# Patient Record
Sex: MALE | Race: BLACK OR AFRICAN AMERICAN | Employment: FULL TIME | URBAN - METROPOLITAN AREA
[De-identification: names, ages, dates, MRNs, and addresses within clinical notes are randomized per-mention and may not be internally consistent; named-entity substitution may affect disease eponyms.]

---

## 2017-08-15 ENCOUNTER — OFFICE VISIT (OUTPATIENT)
Dept: INTERNAL MEDICINE CLINIC | Age: 22
End: 2017-08-15

## 2017-08-15 VITALS
HEIGHT: 71 IN | DIASTOLIC BLOOD PRESSURE: 80 MMHG | BODY MASS INDEX: 35.42 KG/M2 | HEART RATE: 80 BPM | WEIGHT: 253 LBS | SYSTOLIC BLOOD PRESSURE: 122 MMHG

## 2017-08-15 DIAGNOSIS — Z00.00 PREVENTATIVE HEALTH CARE: Primary | ICD-10-CM

## 2017-08-15 LAB
A/G RATIO: 1.6 (ref 1.1–2.2)
ALBUMIN SERPL-MCNC: 4.2 G/DL (ref 3.4–5)
ALP BLD-CCNC: 49 U/L (ref 40–129)
ALT SERPL-CCNC: 22 U/L (ref 10–40)
ANION GAP SERPL CALCULATED.3IONS-SCNC: 13 MMOL/L (ref 3–16)
AST SERPL-CCNC: 17 U/L (ref 15–37)
BILIRUB SERPL-MCNC: 2 MG/DL (ref 0–1)
BUN BLDV-MCNC: 10 MG/DL (ref 7–20)
CALCIUM SERPL-MCNC: 9 MG/DL (ref 8.3–10.6)
CHLORIDE BLD-SCNC: 102 MMOL/L (ref 99–110)
CHOLESTEROL, TOTAL: 217 MG/DL (ref 0–199)
CO2: 25 MMOL/L (ref 21–32)
CREAT SERPL-MCNC: 1.2 MG/DL (ref 0.9–1.3)
GFR AFRICAN AMERICAN: >60
GFR NON-AFRICAN AMERICAN: >60
GLOBULIN: 2.6 G/DL
GLUCOSE BLD-MCNC: 89 MG/DL (ref 70–99)
HCT VFR BLD CALC: 46.8 % (ref 40.5–52.5)
HDLC SERPL-MCNC: 38 MG/DL (ref 40–60)
HEMOGLOBIN: 15.7 G/DL (ref 13.5–17.5)
LDL CHOLESTEROL CALCULATED: 148 MG/DL
MCH RBC QN AUTO: 29.6 PG (ref 26–34)
MCHC RBC AUTO-ENTMCNC: 33.6 G/DL (ref 31–36)
MCV RBC AUTO: 88.1 FL (ref 80–100)
PDW BLD-RTO: 13.1 % (ref 12.4–15.4)
PLATELET # BLD: 195 K/UL (ref 135–450)
PMV BLD AUTO: 9.9 FL (ref 5–10.5)
POTASSIUM SERPL-SCNC: 4.2 MMOL/L (ref 3.5–5.1)
RBC # BLD: 5.31 M/UL (ref 4.2–5.9)
SODIUM BLD-SCNC: 140 MMOL/L (ref 136–145)
TOTAL PROTEIN: 6.8 G/DL (ref 6.4–8.2)
TRIGL SERPL-MCNC: 154 MG/DL (ref 0–150)
VLDLC SERPL CALC-MCNC: 31 MG/DL
WBC # BLD: 6.2 K/UL (ref 4–11)

## 2017-08-15 PROCEDURE — 99395 PREV VISIT EST AGE 18-39: CPT | Performed by: INTERNAL MEDICINE

## 2017-08-15 ASSESSMENT — ENCOUNTER SYMPTOMS
RHINORRHEA: 0
SORE THROAT: 0
DIARRHEA: 0
BACK PAIN: 0
COUGH: 0
WHEEZING: 0
CONSTIPATION: 0
ABDOMINAL PAIN: 0
BLOOD IN STOOL: 0
SHORTNESS OF BREATH: 0
EYE REDNESS: 0

## 2017-08-15 ASSESSMENT — PATIENT HEALTH QUESTIONNAIRE - PHQ9
1. LITTLE INTEREST OR PLEASURE IN DOING THINGS: 0
SUM OF ALL RESPONSES TO PHQ9 QUESTIONS 1 & 2: 0
SUM OF ALL RESPONSES TO PHQ QUESTIONS 1-9: 0
2. FEELING DOWN, DEPRESSED OR HOPELESS: 0

## 2017-08-17 DIAGNOSIS — Z00.00 PREVENTATIVE HEALTH CARE: Primary | ICD-10-CM

## 2017-08-17 LAB
BILIRUB SERPL-MCNC: 1.7 MG/DL (ref 0–1)
BILIRUBIN DIRECT: 0.3 MG/DL (ref 0–0.3)
BILIRUBIN, INDIRECT: 1.4 MG/DL (ref 0–1)

## 2018-02-20 ENCOUNTER — OFFICE VISIT (OUTPATIENT)
Dept: INTERNAL MEDICINE CLINIC | Age: 23
End: 2018-02-20

## 2018-02-20 VITALS
WEIGHT: 258 LBS | HEART RATE: 72 BPM | BODY MASS INDEX: 36.12 KG/M2 | SYSTOLIC BLOOD PRESSURE: 118 MMHG | DIASTOLIC BLOOD PRESSURE: 82 MMHG | HEIGHT: 71 IN

## 2018-02-20 DIAGNOSIS — R06.83 SNORING: ICD-10-CM

## 2018-02-20 DIAGNOSIS — Z11.3 SCREENING FOR STD (SEXUALLY TRANSMITTED DISEASE): Primary | ICD-10-CM

## 2018-02-20 DIAGNOSIS — R40.0 DAYTIME SOMNOLENCE: ICD-10-CM

## 2018-02-20 DIAGNOSIS — R68.89 EXERCISE INTOLERANCE: ICD-10-CM

## 2018-02-20 DIAGNOSIS — M79.10 MYALGIA: ICD-10-CM

## 2018-02-20 PROCEDURE — 99213 OFFICE O/P EST LOW 20 MIN: CPT | Performed by: NURSE PRACTITIONER

## 2018-02-21 LAB
C. TRACHOMATIS DNA ,URINE: NEGATIVE
HIV AG/AB: NORMAL
HIV ANTIGEN: NORMAL
HIV-1 ANTIBODY: NORMAL
HIV-2 AB: NORMAL
N. GONORRHOEAE DNA, URINE: NEGATIVE
RPR: NORMAL

## 2018-02-21 ASSESSMENT — ENCOUNTER SYMPTOMS: GASTROINTESTINAL NEGATIVE: 1

## 2018-02-21 NOTE — PROGRESS NOTES
Subjective:      Patient ID: Gabino Caicedo is a 23 y.o. male. CC: STD testing    HPI: The patient presents to the office today primarily for request for STD testing. He also has some other concerns to discuss. STD testing: The patient has recently entered a new relationship and both he and his girlfriend have decided to have STD testing prior to becoming sexually active. He has some concerns about past relationships though denies any current symptomatic concerns for STD. He has been trying to exercise more and has noticed that he gets aches and pains following exercise. He is not stretching before or after exercise. He reports fatigue with daytime somnolence and acknowledges snoring. Social History   Substance Use Topics    Smoking status: Never Smoker    Smokeless tobacco: Never Used    Alcohol use No      Comment: none       No current outpatient prescriptions on file. No current facility-administered medications for this visit. Review of Systems   Constitutional: Negative for chills and fever. Gastrointestinal: Negative. Genitourinary: Negative. Negative for discharge, penile pain and penile swelling. Skin: Negative. All other systems reviewed and are negative. Objective:   Physical Exam   Constitutional: He is oriented to person, place, and time. He appears well-developed and well-nourished. No distress. HENT:   Head: Normocephalic and atraumatic. Eyes: Conjunctivae are normal. No scleral icterus. Pulmonary/Chest: Effort normal and breath sounds normal. No respiratory distress. Musculoskeletal: Normal range of motion. He exhibits no edema. Neurological: He is alert and oriented to person, place, and time. Skin: Skin is warm and dry. He is not diaphoretic. Psychiatric: He has a normal mood and affect.  His behavior is normal. Thought content normal.     /82   Pulse 72   Ht 5' 11\" (1.803 m)   Wt 258 lb (117 kg)   BMI 35.98 kg/m² Assessment/Plan:  Tod was seen today for other and cough. Diagnoses and all orders for this visit:    Screening for STD (sexually transmitted disease)  -     C.trachomatis N.gonorrhoeae DNA, Urine  -     HIV Screen  -     RPR Reflex to Titer and TPPA    Daytime somnolence  Snoring  - Encouraged sleep med referral  - He declines for now    Myalgia  Exercise intolerance  - Discussed exercise intolerance,  - Rec:  Importance of hydration, stretching      Kerry Tafoya, CNP

## 2019-01-07 ENCOUNTER — TELEPHONE (OUTPATIENT)
Dept: INTERNAL MEDICINE CLINIC | Age: 24
End: 2019-01-07

## 2019-01-07 DIAGNOSIS — R76.11 PPD POSITIVE: Primary | ICD-10-CM

## 2020-11-11 ENCOUNTER — OFFICE VISIT (OUTPATIENT)
Dept: INTERNAL MEDICINE CLINIC | Age: 25
End: 2020-11-11
Payer: COMMERCIAL

## 2020-11-11 VITALS
HEIGHT: 71 IN | SYSTOLIC BLOOD PRESSURE: 134 MMHG | HEART RATE: 102 BPM | TEMPERATURE: 97 F | OXYGEN SATURATION: 98 % | WEIGHT: 287.6 LBS | BODY MASS INDEX: 40.26 KG/M2 | DIASTOLIC BLOOD PRESSURE: 82 MMHG

## 2020-11-11 PROBLEM — E66.01 CLASS 3 SEVERE OBESITY DUE TO EXCESS CALORIES WITHOUT SERIOUS COMORBIDITY WITH BODY MASS INDEX (BMI) OF 40.0 TO 44.9 IN ADULT (HCC): Status: ACTIVE | Noted: 2020-11-11

## 2020-11-11 PROBLEM — R53.83 FATIGUE: Status: ACTIVE | Noted: 2020-11-11

## 2020-11-11 PROBLEM — E66.813 CLASS 3 SEVERE OBESITY DUE TO EXCESS CALORIES WITHOUT SERIOUS COMORBIDITY WITH BODY MASS INDEX (BMI) OF 40.0 TO 44.9 IN ADULT: Status: ACTIVE | Noted: 2020-11-11

## 2020-11-11 PROBLEM — R06.83 SNORES: Status: ACTIVE | Noted: 2020-11-11

## 2020-11-11 PROCEDURE — 99395 PREV VISIT EST AGE 18-39: CPT | Performed by: NURSE PRACTITIONER

## 2020-11-11 ASSESSMENT — PATIENT HEALTH QUESTIONNAIRE - PHQ9
SUM OF ALL RESPONSES TO PHQ QUESTIONS 1-9: 2
2. FEELING DOWN, DEPRESSED OR HOPELESS: 0
1. LITTLE INTEREST OR PLEASURE IN DOING THINGS: 2
SUM OF ALL RESPONSES TO PHQ9 QUESTIONS 1 & 2: 2
SUM OF ALL RESPONSES TO PHQ QUESTIONS 1-9: 2
SUM OF ALL RESPONSES TO PHQ QUESTIONS 1-9: 2

## 2020-11-11 ASSESSMENT — ENCOUNTER SYMPTOMS
SHORTNESS OF BREATH: 0
COUGH: 0
WHEEZING: 0
CHEST TIGHTNESS: 0

## 2020-11-11 NOTE — PROGRESS NOTES
11/11/20     Chief Complaint   Patient presents with    Fatigue     isn't an all the time thing, comes in waves     HPI    Patient has not been seen in this office for a few years     He is here today with complaints of fatigue  Reports that it has been ongoing for years  Becoming more frequent   Reports since he started working from home it has worsened  Actually falling asleep during the day     Sleep habits:  Going to bed around 3922-0202  Falls asleep quickly  Waking up 1x around 0600 - falls back to sleep quickly   Wakes up around 730   Caffeine - none recently   etoh - minimal   Denies nicotine, other substances  Reports that he does snore at night. Has been told he has pauses at night when breathing. Left axilla - skin tag would like removed. Allergies   Allergen Reactions    Pcn [Penicillins]      No current outpatient medications on file. No current facility-administered medications for this visit. Review of Systems   Constitutional: Positive for fatigue. Negative for chills and fever. Respiratory: Negative for cough, chest tightness, shortness of breath and wheezing. Cardiovascular: Negative for chest pain, palpitations and leg swelling. Neurological: Negative for dizziness, tremors, light-headedness and headaches. Vitals:    11/11/20 1444   BP: 134/82   Pulse: 102   Temp: 97 °F (36.1 °C)   SpO2: 98%   Weight: 287 lb 9.6 oz (130.5 kg)   Height: 5' 11\" (1.803 m)      Physical Exam  Vitals signs reviewed. Constitutional:       General: He is not in acute distress. Appearance: He is well-developed. He is obese. He is not ill-appearing or diaphoretic. HENT:      Head: Normocephalic and atraumatic. Cardiovascular:      Rate and Rhythm: Normal rate and regular rhythm. Heart sounds: Normal heart sounds. No murmur. Pulmonary:      Effort: Pulmonary effort is normal. No respiratory distress. Breath sounds: Normal breath sounds. No wheezing or rhonchi.    Skin: General: Skin is warm and dry. Neurological:      General: No focal deficit present. Mental Status: He is alert and oriented to person, place, and time. Psychiatric:         Mood and Affect: Mood and affect normal.         Behavior: Behavior normal.       Assessment/Plan:  1. Annual physical exam  Due for labs   Work on diet and exercise   - Basic Metabolic Panel; Future  - CBC; Future  - Vitamin D 25 Hydroxy; Future  - TSH without Reflex; Future  - Hemoglobin A1C; Future  - Vitamin B12 & Folate; Future  - Methylmalonic Acid, Serum; Future  - Lipid Panel; Future    2. Fatigue, unspecified type  Stable, uncontrolled  Ongoing for years - reports worsening   Checking labs   Referral for sleep medicine - ? VANESSA   - CBC; Future  - Vitamin D 25 Hydroxy; Future  - TSH without Reflex; Future  - Hemoglobin A1C; Future  - Vitamin B12 & Folate; Future  - Methylmalonic Acid, Serum; Future  [facility]    3. Snores  See 2  [facility]    4. Class 3 severe obesity due to excess calories without serious comorbidity with body mass index (BMI) of 40.0 to 44.9 in adult (HCC)  Stable, uncontrolled  Encouraged [facility]    Discussed medications with patient, who voiced understanding of their use and indications. All questions answered.     Follow up in 1 yr and prn     Electronically signed by ERIKA Hardin CNP on 11/11/2020 at 3:15 PM

## 2020-11-12 DIAGNOSIS — Z00.00 ANNUAL PHYSICAL EXAM: ICD-10-CM

## 2020-11-12 DIAGNOSIS — R53.83 FATIGUE, UNSPECIFIED TYPE: ICD-10-CM

## 2020-11-12 LAB
ANION GAP SERPL CALCULATED.3IONS-SCNC: 11 MMOL/L (ref 3–16)
BUN BLDV-MCNC: 10 MG/DL (ref 7–20)
CALCIUM SERPL-MCNC: 9.7 MG/DL (ref 8.3–10.6)
CHLORIDE BLD-SCNC: 101 MMOL/L (ref 99–110)
CHOLESTEROL, TOTAL: 286 MG/DL (ref 0–199)
CO2: 28 MMOL/L (ref 21–32)
CREAT SERPL-MCNC: 1 MG/DL (ref 0.9–1.3)
FOLATE: >20 NG/ML (ref 4.78–24.2)
GFR AFRICAN AMERICAN: >60
GFR NON-AFRICAN AMERICAN: >60
GLUCOSE BLD-MCNC: 104 MG/DL (ref 70–99)
HCT VFR BLD CALC: 44.4 % (ref 40.5–52.5)
HDLC SERPL-MCNC: 37 MG/DL (ref 40–60)
HEMOGLOBIN: 14.8 G/DL (ref 13.5–17.5)
LDL CHOLESTEROL CALCULATED: 219 MG/DL
MCH RBC QN AUTO: 28.6 PG (ref 26–34)
MCHC RBC AUTO-ENTMCNC: 33.3 G/DL (ref 31–36)
MCV RBC AUTO: 85.9 FL (ref 80–100)
PDW BLD-RTO: 13.8 % (ref 12.4–15.4)
PLATELET # BLD: 204 K/UL (ref 135–450)
PMV BLD AUTO: 9.8 FL (ref 5–10.5)
POTASSIUM SERPL-SCNC: 4.4 MMOL/L (ref 3.5–5.1)
RBC # BLD: 5.17 M/UL (ref 4.2–5.9)
SODIUM BLD-SCNC: 140 MMOL/L (ref 136–145)
TRIGL SERPL-MCNC: 148 MG/DL (ref 0–150)
TSH SERPL DL<=0.05 MIU/L-ACNC: 1.22 UIU/ML (ref 0.27–4.2)
VITAMIN B-12: 881 PG/ML (ref 211–911)
VITAMIN D 25-HYDROXY: 28.4 NG/ML
VLDLC SERPL CALC-MCNC: 30 MG/DL
WBC # BLD: 6.2 K/UL (ref 4–11)

## 2020-11-13 LAB
ESTIMATED AVERAGE GLUCOSE: 99.7 MG/DL
HBA1C MFR BLD: 5.1 %

## 2020-11-14 LAB — METHYLMALONIC ACID: 0.13 UMOL/L (ref 0–0.4)

## 2020-12-23 ENCOUNTER — TELEPHONE (OUTPATIENT)
Dept: INTERNAL MEDICINE CLINIC | Age: 25
End: 2020-12-23

## 2020-12-23 ENCOUNTER — TELEPHONE (OUTPATIENT)
Dept: BARIATRICS/WEIGHT MGMT | Age: 25
End: 2020-12-23

## 2020-12-28 NOTE — TELEPHONE ENCOUNTER
Looks like both of these offices have reached out to pt on numerous occasions and have left a vm. Pt has been given information for both practices.

## 2021-01-11 ENCOUNTER — TELEPHONE (OUTPATIENT)
Dept: BARIATRICS/WEIGHT MGMT | Age: 26
End: 2021-01-11

## 2021-01-26 ENCOUNTER — VIRTUAL VISIT (OUTPATIENT)
Dept: PULMONOLOGY | Age: 26
End: 2021-01-26
Payer: COMMERCIAL

## 2021-01-26 DIAGNOSIS — R06.83 SNORING: ICD-10-CM

## 2021-01-26 DIAGNOSIS — E66.01 CLASS 3 SEVERE OBESITY DUE TO EXCESS CALORIES WITHOUT SERIOUS COMORBIDITY WITH BODY MASS INDEX (BMI) OF 40.0 TO 44.9 IN ADULT (HCC): Chronic | ICD-10-CM

## 2021-01-26 DIAGNOSIS — G47.10 HYPERSOMNIA: Primary | ICD-10-CM

## 2021-01-26 PROBLEM — E66.813 CLASS 3 SEVERE OBESITY DUE TO EXCESS CALORIES WITHOUT SERIOUS COMORBIDITY WITH BODY MASS INDEX (BMI) OF 40.0 TO 44.9 IN ADULT: Chronic | Status: ACTIVE | Noted: 2020-11-11

## 2021-01-26 PROCEDURE — 99244 OFF/OP CNSLTJ NEW/EST MOD 40: CPT | Performed by: INTERNAL MEDICINE

## 2021-01-26 ASSESSMENT — SLEEP AND FATIGUE QUESTIONNAIRES
HOW LIKELY ARE YOU TO NOD OFF OR FALL ASLEEP WHILE SITTING AND READING: 2
HOW LIKELY ARE YOU TO NOD OFF OR FALL ASLEEP WHILE SITTING QUIETLY AFTER LUNCH WITHOUT ALCOHOL: 2
HOW LIKELY ARE YOU TO NOD OFF OR FALL ASLEEP WHEN YOU ARE A PASSENGER IN A CAR FOR AN HOUR WITHOUT A BREAK: 2

## 2021-01-26 NOTE — LETTER
[facility] Sleep Medicine  [id number] [facility]  [place]  [address]  Phone: [phone]  Fax: [phone]      January 26, 2021       Patient: Erasmo Mon   MR Number: <[id number]>   YOB: 1995   Date of Visit: 1/26/2021     Thank you for allowing me to participate in the care of Erasmo Mon. Here is my assessment and plan. Also attached is a copy of his consult note:    ASSESSMENT:  Visit Diagnoses and Associated Orders     Hypersomnia   (New Problem)  -  Primary    needs work-up    Home Sleep Study (HST) [23709 Custom]   - Future Order         Snoring   (New Problem)      needs work-up    Home Sleep Study (HST) [06211 Custom]   - Future Order         Class 3 severe obesity due to excess calories without serious comorbidity with body mass index (BMI) of 40.0 to 44.9 in adult ([facility]   (Stable)                 Plan:  Differential diagnosis includes but not limited to: VANESSA, PLMD's, narcolepsy, parasomnias. Reviewed VANESSA (highest likelihood Dx): pathophysiology, diagnosis, complications and treatment. Instructed him not to drive if drowsy. Continue medications per her PCP and other physicians. Limit caffeine use after 3pm. Standard of care is to do in-lab PSG but insurance is mandating an inferior HST. 1 wk follow up after study to review his results. The chronic medical conditions listed are directly related to the primary diagnosis listed above. The management of the primary diagnosis affects the secondary diagnosis and vice versa. Pt would medically benefit from wt loss for VANESSA (diet, exercise, surgical). Orders Placed This Encounter   Procedures    Home Sleep Study (HST)         If you have questions or concerns, please do not hesitate to call me. I look forward to following Tod along with you.     Sincerely,      Marily Bacon MD    CC providers:  Joie Barrera, APRN - CNP  [address] [address]  [place] [place]

## 2021-01-26 NOTE — PROGRESS NOTES
 Home Sleep Study (HST)          Subjective:     Patient ID: Jaye Lau is a 30 y.o. male. Chief Complaint   Patient presents with    Snoring    Daytime Sleepiness       HPI:      Jaye Lau is a 30 y.o. male referred by ERIKA Irwin* for a sleep evaluation. He complains of: snoring, witnessed apneas, excessive daytime sleepiness , non-restorative sleep, AM headaches, drowsiness while driving and tossing and turning at night. He denies: cataplexy and hypnagogic hallucinations. obesity: stable on meds and followed by pt's PCP and other physicians. Previous evaluation and treatment has included- none    DOT/CDL - No  FAA/'s license -No    Previous Report(s) Reviewed: historical medical records, office notes, andreferral letter(s). Pertinent data has been documented.     Sinclair - Total score: 15    Caffeine Intake - Coffee: 1 cup(s) per day    Social History     Socioeconomic History    Marital status: Single     Spouse name: Not on file    Number of children: Not on file    Years of education: Not on file    Highest education level: Not on file   Occupational History    Not on file   Social Needs    Financial resource strain: Not on file    Food insecurity     Worry: Not on file     Inability: Not on file    Transportation needs     Medical: Not on file     Non-medical: Not on file   Tobacco Use    Smoking status: Never Smoker    Smokeless tobacco: Never Used   Substance and Sexual Activity    Alcohol use: No     Alcohol/week: 0.0 standard drinks     Comment: none    Drug use: No    Sexual activity: Not Currently     Comment: single    Lifestyle    Physical activity     Days per week: Not on file     Minutes per session: Not on file    Stress: Not on file   Relationships    Social connections     Talks on phone: Not on file     Gets together: Not on file     Attends Holiness service: Not on file     Active member of club or organization: Not on file Attends meetings of clubs or organizations: Not on file     Relationship status: Not on file    Intimate partner violence     Fear of current or ex partner: Not on file     Emotionally abused: Not on file     Physically abused: Not on file     Forced sexual activity: Not on file   Other Topics Concern    Not on file   Social History Narrative    Not on file        No current outpatient medications on file. No current facility-administered medications for this visit. Allergies as of 01/26/2021 - Review Complete 01/26/2021   Allergen Reaction Noted    Pcn [penicillins]  06/29/2015       Patient Active Problem List   Diagnosis    Class 3 severe obesity due to excess calories without serious comorbidity with body mass index (BMI) of 40.0 to 44.9 in adult (HCC)    Snores    Fatigue       History reviewed. No pertinent past medical history. History reviewed. No pertinent surgical history. Family History   Problem Relation Age of Onset    Cancer Mother         breast    Diabetes Mother     Diabetes Maternal Grandmother        Objective:     Vitals:  Patient reported Height and Weight Calculated BMI   Patient-Reported Vitals 1/26/2021   Patient-Reported Weight 285#   Patient-Reported Height 5'11\"       39.7     Due to COVID-19 this was a virtual visit and physical exam was deferred.     Electronically signed by Franky Richter MD on1/26/2021 at 3:04 PM

## 2021-01-27 ENCOUNTER — TELEPHONE (OUTPATIENT)
Dept: SLEEP CENTER | Age: 26
End: 2021-01-27

## 2021-04-15 ENCOUNTER — OFFICE VISIT (OUTPATIENT)
Dept: INTERNAL MEDICINE CLINIC | Age: 26
End: 2021-04-15
Payer: COMMERCIAL

## 2021-04-15 VITALS
BODY MASS INDEX: 39.58 KG/M2 | OXYGEN SATURATION: 95 % | DIASTOLIC BLOOD PRESSURE: 78 MMHG | WEIGHT: 283.8 LBS | SYSTOLIC BLOOD PRESSURE: 128 MMHG | HEART RATE: 86 BPM

## 2021-04-15 DIAGNOSIS — E66.01 CLASS 3 SEVERE OBESITY DUE TO EXCESS CALORIES WITHOUT SERIOUS COMORBIDITY WITH BODY MASS INDEX (BMI) OF 40.0 TO 44.9 IN ADULT (HCC): ICD-10-CM

## 2021-04-15 DIAGNOSIS — R53.83 FATIGUE, UNSPECIFIED TYPE: ICD-10-CM

## 2021-04-15 DIAGNOSIS — R06.83 SNORES: Primary | ICD-10-CM

## 2021-04-15 PROCEDURE — 99214 OFFICE O/P EST MOD 30 MIN: CPT | Performed by: NURSE PRACTITIONER

## 2021-04-15 ASSESSMENT — PATIENT HEALTH QUESTIONNAIRE - PHQ9
2. FEELING DOWN, DEPRESSED OR HOPELESS: 0
SUM OF ALL RESPONSES TO PHQ QUESTIONS 1-9: 0
1. LITTLE INTEREST OR PLEASURE IN DOING THINGS: 0
SUM OF ALL RESPONSES TO PHQ9 QUESTIONS 1 & 2: 0

## 2021-04-15 NOTE — LETTER
Patient Name: Agnes Arzate        [facility]  Certification of Health Care Provider for  [facility] Serious Health Condition  (Family and Medical Leave Act)    Attached please find the applicable completed  [address] [facility]) certification form. [address] certification forms (W-380-E, W-380-F, [id number], & WH-385-V). Accordingly, the attached form complies in all material respects with applicable [facility] regulations and is being provided in lieu of any certification forms submitted to [facility] by the Employer. Provider's name: Jessica Leon, APRN - CNP      [address] [facility] INTERNAL MEDICINE  [address] [facility] [address]  Dept: [phone]  Dept Fax: [phone]  Loc: [phone]    PART A: MEDICAL FACTS  1. Approximate date condition commenced: 4/15/2021  Probable duration of condition:3 months     Azam below as applicable:  Was the patient admitted for an overnight stay in a hospital, hospice, or residential medical     care facility? No  If so, dates of admission: N/A. Date(s) you treated the patient for condition: 2020 and 4/15/2021. Will the patient need to have treatment visits at least twice per year due to the condition? Yes. Was medication, other than over-the-counter medication, prescribed? No     Was the patient referred to other health care provider(s) for evaluation or treatment (e.g.,            physical therapist)? Yes. If so, state the nature of such treatments and expected          duration of treatment: Sleep Medicine. 2. Is the medical condition pregnancy? No. If so, expected delivery date: N/A.    3. Use any information provided by the employer to answer this question.  If the employer fails to provide a list of the employee's essential functions or a job description, answer these             questions based upon the employee's own description of his/her job functions. Is the employee unable to perform any of his/her job functions due to the condition:             No.    If so, Identify the job functions the employee is unable to perform: N/A.    4. Describe other relevant medical facts, if any, related to the condition for which the employee    seeks leave (such medical facts may include symptoms, diagnosis, or any regimen of continuing treatment such as the use of specialized equipment): daytime fatigue that is uncontrolled, working with sleep medicine to get a home sleep study to determine appropriate treatment. Kusum Faith PART B: AMOUNT OF LEAVE NEEDED  5. Will the employee be incapacitated for a single continuous period of time due to his/her medical condition, including any time for treatment and recovery? No.     If so, estimate the beginning and ending dates for the period of incapacity: N/A.    6. Will the employee need to attend follow-up treatment appointments or work part-time or on a reduced schedule because of the employee's medical condition? Employee needs increased break times during the day to help with daytime fatigue secondary to uncontrolled sleep condition. Has appt scheduled for work up and follow up with sleep medicine. If so, are the treatments or the reduced number of hours of work medically necessary? Yes. Estimated treatment schedule, if any, including the dates of any scheduled   appointments and the time required for each appointment, including any recovery period: will need close follow up initially for diagnosis and treatment options, lasting 2-3 hrs at a time. .     Estimate the part-time or reduced work schedule the employee needs, if any: increase break time by 2-3 hrs per week      7. Will the condition cause episodic flare-ups periodically preventing the employee from  performing his/her job functions? Yes until stable and controlled .     Is it medically necessary for the employee to be absent from work during

## 2021-04-15 NOTE — PROGRESS NOTES
4/15/21     Chief Complaint   Patient presents with    Forms     FMLA, sleep apnea, says he has to take more breaks during work than allowed     HPI     Pt is here for fatigue, hypersomnia, snoring and obesity   Seeing Dr. Luci Mars and working up for likely VANESSA   Has HST scheduled on 4/23/2021, mandated by his insurance over an in lab PSG  Due to daytime fatigue - is getting up and moving around more to take increased breaks to prevent from falling asleep during the day. 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 Pt is asking for increased break times until work up is complete and stabilized     Allergies   Allergen Reactions    Pcn [Penicillins]      No current outpatient medications on file. No current facility-administered medications for this visit. Review of Systems  Negative other than HPI     Vitals:    04/15/21 0830   BP: 128/78   Pulse: 86   SpO2: 95%   Weight: 283 lb 12.8 oz (128.7 kg)      Physical Exam  Constitutional:       General: He is not in acute distress. Appearance: Normal appearance. He is obese. He is not ill-appearing. HENT:      Head: Normocephalic and atraumatic. Pulmonary:      Effort: Pulmonary effort is normal. No respiratory distress. Neurological:      General: No focal deficit present. Mental Status: He is alert and oriented to person, place, and time. Mental status is at baseline.    Psychiatric:         Mood and Affect: Mood normal.         Behavior: Behavior normal.       Assessment/Plan:  Snores/ Fatigue, unspecified type  Stable, uncontrolled  Has HST scheduled for 4/23  Working with sleep medicine for better control  FMLA complete for increased break time due to fatigue until controlled    Class 3 severe obesity due to excess calories without serious comorbidity with body mass index (BMI) of 40.0 to 44.9 in adult [facility])  Uncontrolled  Pt could benefit from weight loss  Work on diet and exercise     Discussed medications with patient, who voiced understanding of their use and indications. All questions answered.     Reviewed follow up criteria     Electronically signed by ERIKA Phipps CNP on 4/15/2021 at 8:58 AM

## 2021-04-26 ENCOUNTER — HOSPITAL ENCOUNTER (OUTPATIENT)
Dept: SLEEP CENTER | Age: 26
Discharge: HOME | End: 2021-04-26
Payer: COMMERCIAL

## 2021-04-26 DIAGNOSIS — R06.83 SNORING: ICD-10-CM

## 2021-04-26 DIAGNOSIS — G47.10 HYPERSOMNIA: ICD-10-CM

## 2021-04-26 PROCEDURE — 95806 SLEEP STUDY UNATT&RESP EFFT: CPT

## 2021-04-26 PROCEDURE — 95806 SLEEP STUDY UNATT&RESP EFFT: CPT | Performed by: INTERNAL MEDICINE

## 2021-04-29 ENCOUNTER — TELEPHONE (OUTPATIENT)
Dept: PULMONOLOGY | Age: 26
End: 2021-04-29

## 2021-04-29 NOTE — PROGRESS NOTES
Usha Lea         : 1995    Diagnosis: [x] VANESSA (G47.33) [] CSA (G47.31) [] Apnea (G47.30)   Length of Need: [x] 12 Months [] 99 Months [] Other:    Machine (ARI!): [x] Respironics Dream Station   2   Auto [] ResMed AirSense     Auto [] Other:     [x]  CPAP () [] Bilevel ()   Mode: [x] Auto [] Spontaneous    Mode: [] Auto [] Spontaneous          P min 7 cmH2O  P ma 17 cmH2O                 Comfort Settings:   - Ramp Pressure: 4 cmH2O                                        - Ramp time: 15 min                                     -  Flex/EPR - 3 full time                                    - For ResMed Bilevel (TiMax-4 sec   TiMin- 0.2 sec)     Humidifier: [x] Heated ()        [x] Water chamber replacement ()/ 1 per 6 months        Mask:   [x] Nasal () /1 per 3 months [] Full Face () /1 per 3 months   [x] Patient choice -Size and fit mask [] Patient Choice - Size and fit mask   [] Dispense:  [] Dispense:    [x] Headgear () / 1 per 3 months [] Headgear () / 1 per 3 months   [x] Replacement Nasal Cushion ()/2 per month [] Interface Replacement ()/1 per month   [] Replacement Nasal Pillows ()/2 per month         Tubing: [x] Heated ()/1 per 3 months    [] Standard ()/1 per 3 months [] Other:           Filters: [x] Non-disposable ()/1 per 6 months     [x] Ultra-Fine, Disposable ()/2 per month        Miscellaneous: [] Chin Strap ()/ 1 per 6 months [] O2 bleed-in:       LPM   [] Oximetry on CPAP/Bilevel []  Other:    [x] Modem: ()         Start Order Date: 21    MEDICAL JUSTIFICATION:  I, the undersigned, certify that the above prescribed supplies are medically necessary for this patients wellbeing. In my opinion, the supplies are both reasonable and necessary in reference to accepted standards of medicalpractice in treatment of this patients condition.     Sera Mcintyre MD      NPI: [id number]       Order Signed Date: 21    Electronically signed by Catalina Davila MD on 2021 at 9:53 AM    Rachel Lyon  1995  [address] [place] [address] [address]  [phone] (home)   [phone] (mobile)      Insurance Info (confirm with patient if correct):  Payor/Plan Subscr  Sex Relation Sub.  Ins. ID Effective Group Num

## 2021-04-29 NOTE — PROGRESS NOTES
Rachel Hidalgo         : 1995 [facility]    Diagnosis: [x] VANESSA (G47.33) [] CSA (G47.31) [] Apnea (G47.30)   Length of Need: [x] 12 Months [] 99 Months [] Other:    Machine (ARI!): [x] Respironics Dream Station   2   Auto [] ResMed AirSense     Auto [] Other:     [x]  CPAP () [] Bilevel ()   Mode: [x] Auto [] Spontaneous    Mode: [] Auto [] Spontaneous         P min 7 cmH2O  P max 17 mH2O                   Comfort Settings:   - Ramp Pressure: 4 cmH2O                                        - Ramp time: 15 min                                     -  Flex/EPR - 3 full time                                    - For ResMed Bilevel (TiMax-4 sec   TiMin- 0.2 sec)     Humidifier: [x] Heated ()        [x] Water chamber replacement ()/ 1 per 6 months        Mask:   [x] Nasal () /1 per 3 months [] Full Face () /1 per 3 months   [x] Patient choice -Size and fit mask [] Patient Choice - Size and fit mask   [] Dispense:  [] Dispense:    [x] Headgear () / 1 per 3 months [] Headgear () / 1 per 3 months   [x] Replacement Nasal Cushion ()/2 per month [] Interface Replacement ()/1 per month   [] Replacement Nasal Pillows ()/2 per month         Tubing: [x] Heated ()/1 per 3 months    [] Standard ()/1 per 3 months [] Other:           Filters: [x] Non-disposable ()/1 per 6 months     [x] Ultra-Fine, Disposable ()/2 per month        Miscellaneous: [] Chin Strap ()/ 1 per 6 months [] O2 bleed-in:       LPM   [] Oximetry on CPAP/Bilevel []  Other:    [x] Modem: ()         Start Order Date: 21    MEDICAL JUSTIFICATION:  I, the undersigned, certify that the above prescribed supplies are medically necessary for this patients wellbeing. In my opinion, the supplies are both reasonable and necessary in reference to accepted standards of medicalpractice in treatment of this patients condition.     Arjun Elder MD      NPI: [id number]       Order Signed Date: 21    Electronically signed by Alina Morales MD on 2021 at 10:11 AM    Tod Og  1995  [address] [place] [address] [address]  [phone] (home)   [phone] (mobile)      Insurance Info (confirm with patient if correct):  Payor/Plan Subscr  Sex Relation Sub.  Ins. ID Effective Group Num

## 2021-06-03 ENCOUNTER — OFFICE VISIT (OUTPATIENT)
Dept: INTERNAL MEDICINE CLINIC | Age: 26
End: 2021-06-03
Payer: COMMERCIAL

## 2021-06-03 VITALS
BODY MASS INDEX: 40.18 KG/M2 | DIASTOLIC BLOOD PRESSURE: 86 MMHG | HEART RATE: 80 BPM | HEIGHT: 71 IN | SYSTOLIC BLOOD PRESSURE: 128 MMHG | WEIGHT: 287 LBS

## 2021-06-03 DIAGNOSIS — F41.9 ANXIETY AND DEPRESSION: ICD-10-CM

## 2021-06-03 DIAGNOSIS — R53.83 FATIGUE, UNSPECIFIED TYPE: ICD-10-CM

## 2021-06-03 DIAGNOSIS — E55.9 VITAMIN D DEFICIENCY: ICD-10-CM

## 2021-06-03 DIAGNOSIS — F32.A ANXIETY AND DEPRESSION: Primary | ICD-10-CM

## 2021-06-03 DIAGNOSIS — Z99.89 OSA ON CPAP: ICD-10-CM

## 2021-06-03 DIAGNOSIS — F32.A ANXIETY AND DEPRESSION: ICD-10-CM

## 2021-06-03 DIAGNOSIS — G47.33 OSA ON CPAP: ICD-10-CM

## 2021-06-03 DIAGNOSIS — F41.9 ANXIETY AND DEPRESSION: Primary | ICD-10-CM

## 2021-06-03 DIAGNOSIS — E66.01 CLASS 3 SEVERE OBESITY DUE TO EXCESS CALORIES WITHOUT SERIOUS COMORBIDITY WITH BODY MASS INDEX (BMI) OF 40.0 TO 44.9 IN ADULT (HCC): Chronic | ICD-10-CM

## 2021-06-03 LAB
T4 FREE: 1.1 NG/DL (ref 0.9–1.8)
TSH SERPL DL<=0.05 MIU/L-ACNC: 0.82 UIU/ML (ref 0.27–4.2)
VITAMIN D 25-HYDROXY: 37.8 NG/ML

## 2021-06-03 PROCEDURE — 99214 OFFICE O/P EST MOD 30 MIN: CPT | Performed by: NURSE PRACTITIONER

## 2021-06-03 ASSESSMENT — COLUMBIA-SUICIDE SEVERITY RATING SCALE - C-SSRS
1. WITHIN THE PAST MONTH, HAVE YOU WISHED YOU WERE DEAD OR WISHED YOU COULD GO TO SLEEP AND NOT WAKE UP?: YES
6. HAVE YOU EVER DONE ANYTHING, STARTED TO DO ANYTHING, OR PREPARED TO DO ANYTHING TO END YOUR LIFE?: NO
2. HAVE YOU ACTUALLY HAD ANY THOUGHTS OF KILLING YOURSELF?: NO

## 2021-06-03 ASSESSMENT — PATIENT HEALTH QUESTIONNAIRE - PHQ9
4. FEELING TIRED OR HAVING LITTLE ENERGY: 2
SUM OF ALL RESPONSES TO PHQ QUESTIONS 1-9: 17
1. LITTLE INTEREST OR PLEASURE IN DOING THINGS: 3
9. THOUGHTS THAT YOU WOULD BE BETTER OFF DEAD, OR OF HURTING YOURSELF: 1
10. IF YOU CHECKED OFF ANY PROBLEMS, HOW DIFFICULT HAVE THESE PROBLEMS MADE IT FOR YOU TO DO YOUR WORK, TAKE CARE OF THINGS AT HOME, OR GET ALONG WITH OTHER PEOPLE: 2
SUM OF ALL RESPONSES TO PHQ QUESTIONS 1-9: 16
SUM OF ALL RESPONSES TO PHQ9 QUESTIONS 1 & 2: 5
2. FEELING DOWN, DEPRESSED OR HOPELESS: 2
SUM OF ALL RESPONSES TO PHQ QUESTIONS 1-9: 17
6. FEELING BAD ABOUT YOURSELF - OR THAT YOU ARE A FAILURE OR HAVE LET YOURSELF OR YOUR FAMILY DOWN: 3
8. MOVING OR SPEAKING SO SLOWLY THAT OTHER PEOPLE COULD HAVE NOTICED. OR THE OPPOSITE, BEING SO FIGETY OR RESTLESS THAT YOU HAVE BEEN MOVING AROUND A LOT MORE THAN USUAL: 0
7. TROUBLE CONCENTRATING ON THINGS, SUCH AS READING THE NEWSPAPER OR WATCHING TELEVISION: 3
5. POOR APPETITE OR OVEREATING: 2
3. TROUBLE FALLING OR STAYING ASLEEP: 1

## 2021-06-03 NOTE — PROGRESS NOTES
6/3/21     Chief Complaint   Patient presents with    Depression     with anxiety would like to see a psychiatrist.     HPI     Here for anixety and depression   Previously reports he has done better at managing his symptoms with meditation and coping skills. Realizing that he is having trouble doing this. Not wanting to do anything fun or motivation. Increased stress with work. Finding himself freezing with stress. Feeling overwhelmed a lot of the time. Symptoms are worse over the past few years - becoming more frequent and intense over the past year. Has not ever expressed these concerns to anyone before. Here with s/o who is supportive. Has thoughts of being better off dead, denies any SI/HI, plan or self harm. Denies any previous self harm. VANESSA - sleep is getting better with cpap - increased energy during the day. PHQ Scores 6/3/2021 4/15/2021 11/11/2020 8/15/2017   PHQ2 Score 5 0 2 0   PHQ9 Score 17 0 2 0     Interpretation of Total Score Depression Severity: 1-4 = Minimal depression, 5-9 = Mild depression, 10-14 = Moderate depression, 15-19 = Moderately severe depression, 20-27 = Severe depression    Allergies   Allergen Reactions    Pcn [Penicillins]      Current Outpatient Medications   Medication Sig Dispense Refill    sertraline (ZOLOFT) 50 MG tablet Take 1 tablet by mouth daily 30 tablet 1     No current facility-administered medications for this visit. Review of Systems   Negative other than HPI     Vitals:    06/03/21 0944   BP: 128/86   Pulse: 80   Weight: 287 lb (130.2 kg)   Height: 5' 11\" (1.803 m)      Physical Exam  Constitutional:       General: He is not in acute distress. Appearance: Normal appearance. He is not ill-appearing. HENT:      Head: Normocephalic and atraumatic. Pulmonary:      Effort: Pulmonary effort is normal. No respiratory distress. Neurological:      General: No focal deficit present.       Mental Status: He is alert and oriented to person, place, and time. Mental status is at baseline. Psychiatric:         Mood and Affect: Mood normal.         Behavior: Behavior normal.       Assessment/Plan:  1. Anxiety and depression  uncontrolled  TSH 1.22 in November - rechecking labs today given increased symptoms   Discussed options in detail - referral to see Cj/ Dr. Evelyn Merritt, appt scheduled for tomorrow. Starting sertraline at 50mg - aware we will likely need to increase dose.    - TSH without Reflex; Future  - T4, Free; Future  - sertraline (ZOLOFT) 50 MG tablet; Take 1 tablet by mouth daily  Dispense: 30 tablet; Refill: 1    I've explained to him that drugs of the SSRI class can have side effects such as weight gain, sexual dysfunction, insomnia, headache, nausea. These medications are generally effective at alleviating symptoms of anxiety and/or depression. Let me know if significant side effects do occur. 2. VANESSA on CPAP  Stable, doing well on cpap   Fatigue is improving     3. Fatigue, unspecified type  See above     4. Class 3 severe obesity due to excess calories without serious comorbidity with body mass index (BMI) of 40.0 to 44.9 in adult (HCC)  Stable, work on diet and exercise     5. Vitamin D deficiency  Stable, on supplement and rechecking D today   - Vitamin D 25 Hydroxy; Future    Discussed medications with patient, who voiced understanding of their use and indications. All questions answered. Return in about 6 weeks (around 7/15/2021), or if symptoms worsen or fail to improve, for mood.       Electronically signed by Alejo Skiff, APRN - CNP on 6/3/2021 at 10:33 AM

## 2021-06-04 ENCOUNTER — OFFICE VISIT (OUTPATIENT)
Dept: PSYCHOLOGY | Age: 26
End: 2021-06-04
Payer: COMMERCIAL

## 2021-06-04 DIAGNOSIS — F43.21 ADJUSTMENT DISORDER WITH DEPRESSED MOOD: Primary | ICD-10-CM

## 2021-06-04 PROCEDURE — 90791 PSYCH DIAGNOSTIC EVALUATION: CPT | Performed by: PSYCHOLOGIST

## 2021-06-04 NOTE — PROGRESS NOTES
Behavioral Health Consultation  Agatha Rodriguez M.A. Behavioral Health Consultant  Psychology Trainee  Modesta Dasilva, Ph.D.  Psychology Supervisor  6/4/2021  9:05 AM      Time spent with Patient: 35 minutes  This is patient's first [facility] appointment. Reason for Consult:  Depression    Pt provided informed consent for the behavioral health program and nature of supervision. Discussed with patient model of service to include the limits of confidentiality (i.e. abuse reporting, suicide intervention, etc.) and short-term intervention focused approach. Pt indicated understanding. Feedback given to PCP. Provider location: [place]:  Pt seen per PCP re: depression. Patient reported depressive symptoms, including depressed mood, deactivation, anhedonia, and fatigue. Pt indicated that he has though he strong coping skills, but thinks that he is not properly balancing different aspects of his life. 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 He is concerned that he will remain \"stuck\" in his current financial and educational circumstances. Pt is working towards  certification but sometimes struggles managing both his classes and work. Pt queried clinician regarding FMLA paperwork, and when told that his concerns would likely not receive approval for an FMLA, Pt expressed disappointment. Pt sleeps for approximately 6-7 hours per night and finds it restful with his CPAP. He has strong social support.      O:  MSE:    Appearance    alert, cooperative  Impulsive behavior No  Speech    normal rate and normal volume  Mood    Depressed  Affect    normal affect  Thought Content    intact  Thought Process    linear and coherent  Associations    logical connections  Insight    Good  Judgment    Intact  Orientation    oriented to person, place, time, and general circumstances Memory    recent and remote memory intact  Attention/Concentration    intact  Morbid ideation No  Suicide Assessment    no suicidal ideation    History:    Social History:   Social History     Socioeconomic History    Marital status: Single     Spouse name: Not on file    Number of children: Not on file    Years of education: Not on file    Highest education level: Not on file   Occupational History    Not on file   Tobacco Use    Smoking status: Never Smoker    Smokeless tobacco: Never Used   Substance and Sexual Activity    Alcohol use: No     Alcohol/week: 0.0 standard drinks     Comment: none    Drug use: No    Sexual activity: Not Currently     Comment: single    Other Topics Concern    Not on file   Social History Narrative    Not on file     Social Determinants of Health     Financial Resource Strain:     Difficulty of Paying Living Expenses:    Food Insecurity:     Worried About Running Out of Food in the Last Year:     Ran Out of Food in the Last Year:    Transportation Needs:     Lack of Transportation (Medical):  Lack of Transportation (Non-Medical):    Physical Activity:     Days of Exercise per Week:     Minutes of Exercise per Session:    Stress:     Feeling of Stress :    Social Connections:     Frequency of Communication with Friends and Family:     Frequency of Social Gatherings with Friends and Family:     Attends Church Services:     Active Member of Clubs or Organizations:     Attends Club or Organization Meetings:     Marital Status:    Intimate Partner Violence:     Fear of Current or Ex-Partner:     Emotionally Abused:     Physically Abused:     Sexually Abused:      TOBACCO:   reports that he has never smoked. He has never used smokeless tobacco.  ETOH:   reports no history of alcohol use.     Diagnosis:  Adjustment disorder w/ depressed mood     Plan:  Pt interventions:  Established rapport, Conducted functional assessment, [place]-setting to identify pt's primary goals for [facility] visit / overall health and Supportive techniques, Provided psychoeducation on behavioral activation and collaboratively identified ways for Pt to regularly engage in his previous activities

## 2022-12-12 ENCOUNTER — TELEPHONE (OUTPATIENT)
Dept: INTERNAL MEDICINE CLINIC | Age: 27
End: 2022-12-12

## 2022-12-12 NOTE — TELEPHONE ENCOUNTER
Right ear issue. Feels like he cannot hear well. Onset 2 weeks ago but worse x 2.5 days. Fever and runny nose at first.  Went to urgent care and given steroids. Seemed to be doing better but 2 nights ago noted right ear with hearing loss --- like having an ear bud in the ear. Not painful. Discussed most likely middle ear fluid and may take time to resolve. Recommend Sudafed PSE 12 hour prn and Flonase 2 sprays each nostril daily until ear is better. Recommend in person OV if painful.

## 2023-01-13 ENCOUNTER — TELEPHONE (OUTPATIENT)
Dept: INTERNAL MEDICINE CLINIC | Age: 28
End: 2023-01-13

## 2023-01-13 NOTE — TELEPHONE ENCOUNTER
Hold for [employer]. I notified pt. He needs seen since it has been so long - I did not want to use any blocked slots without approval. Advised pt. [employer] would be back in Tuesday to let him know what his next available is.

## 2023-01-13 NOTE — TELEPHONE ENCOUNTER
----- Message from [facility] sent at 1/13/2023  2:50 PM EST -----  Subject: Message to Provider    QUESTIONS  Information for Provider? Patient is needing a prescription C-PAP machine   . His insurance company stated he needs a prescription, his demographics,   and the pressure setting sent to them so it can be approved. He now has   [employer], please call patient for further questions or when the prescription   is sent.   ---------------------------------------------------------------------------  --------------  [facility]  [phone]; OK to leave message on voicemail  ---------------------------------------------------------------------------  --------------  SCRIPT ANSWERS  Relationship to Patient?  Self

## 2023-01-27 ENCOUNTER — OFFICE VISIT (OUTPATIENT)
Dept: INTERNAL MEDICINE CLINIC | Age: 28
End: 2023-01-27
Payer: OTHER GOVERNMENT

## 2023-01-27 VITALS
HEART RATE: 76 BPM | OXYGEN SATURATION: 97 % | HEIGHT: 71 IN | WEIGHT: 275 LBS | SYSTOLIC BLOOD PRESSURE: 128 MMHG | DIASTOLIC BLOOD PRESSURE: 82 MMHG | BODY MASS INDEX: 38.5 KG/M2

## 2023-01-27 DIAGNOSIS — E66.09 CLASS 2 OBESITY DUE TO EXCESS CALORIES WITHOUT SERIOUS COMORBIDITY WITH BODY MASS INDEX (BMI) OF 38.0 TO 38.9 IN ADULT: ICD-10-CM

## 2023-01-27 DIAGNOSIS — G47.33 OSA ON CPAP: ICD-10-CM

## 2023-01-27 DIAGNOSIS — E78.41 ELEVATED LIPOPROTEIN(A): ICD-10-CM

## 2023-01-27 DIAGNOSIS — R73.09 ELEVATED GLUCOSE: Primary | ICD-10-CM

## 2023-01-27 DIAGNOSIS — Z71.89 ACP (ADVANCE CARE PLANNING): ICD-10-CM

## 2023-01-27 DIAGNOSIS — Z00.00 ENCOUNTER FOR WELL ADULT EXAM WITHOUT ABNORMAL FINDINGS: ICD-10-CM

## 2023-01-27 DIAGNOSIS — F32.A ANXIETY AND DEPRESSION: ICD-10-CM

## 2023-01-27 DIAGNOSIS — F41.9 ANXIETY AND DEPRESSION: ICD-10-CM

## 2023-01-27 DIAGNOSIS — Z00.00 ENCOUNTER FOR WELL ADULT EXAM WITHOUT ABNORMAL FINDINGS: Primary | ICD-10-CM

## 2023-01-27 DIAGNOSIS — Z99.89 OSA ON CPAP: ICD-10-CM

## 2023-01-27 LAB
ANION GAP SERPL CALCULATED.3IONS-SCNC: 15 MMOL/L (ref 3–16)
BUN BLDV-MCNC: 8 MG/DL (ref 7–20)
CALCIUM SERPL-MCNC: 9.9 MG/DL (ref 8.3–10.6)
CHLORIDE BLD-SCNC: 100 MMOL/L (ref 99–110)
CHOLESTEROL, TOTAL: 282 MG/DL (ref 0–199)
CO2: 25 MMOL/L (ref 21–32)
CREAT SERPL-MCNC: 1.1 MG/DL (ref 0.9–1.3)
GFR SERPL CREATININE-BSD FRML MDRD: >60 ML/MIN/{1.73_M2}
GLUCOSE BLD-MCNC: 124 MG/DL (ref 70–99)
HDLC SERPL-MCNC: 35 MG/DL (ref 40–60)
LDL CHOLESTEROL CALCULATED: 210 MG/DL
POTASSIUM SERPL-SCNC: 4.6 MMOL/L (ref 3.5–5.1)
SODIUM BLD-SCNC: 140 MMOL/L (ref 136–145)
TRIGL SERPL-MCNC: 185 MG/DL (ref 0–150)
VLDLC SERPL CALC-MCNC: 37 MG/DL

## 2023-01-27 PROCEDURE — 99395 PREV VISIT EST AGE 18-39: CPT | Performed by: NURSE PRACTITIONER

## 2023-01-27 RX ORDER — ATORVASTATIN CALCIUM 20 MG/1
20 TABLET, FILM COATED ORAL DAILY
Qty: 30 TABLET | Refills: 3 | Status: SHIPPED | OUTPATIENT
Start: 2023-01-27

## 2023-01-27 SDOH — ECONOMIC STABILITY: FOOD INSECURITY: WITHIN THE PAST 12 MONTHS, THE FOOD YOU BOUGHT JUST DIDN'T LAST AND YOU DIDN'T HAVE MONEY TO GET MORE.: NEVER TRUE

## 2023-01-27 SDOH — ECONOMIC STABILITY: FOOD INSECURITY: WITHIN THE PAST 12 MONTHS, YOU WORRIED THAT YOUR FOOD WOULD RUN OUT BEFORE YOU GOT MONEY TO BUY MORE.: NEVER TRUE

## 2023-01-27 ASSESSMENT — PATIENT HEALTH QUESTIONNAIRE - PHQ9
SUM OF ALL RESPONSES TO PHQ9 QUESTIONS 1 & 2: 0
8. MOVING OR SPEAKING SO SLOWLY THAT OTHER PEOPLE COULD HAVE NOTICED. OR THE OPPOSITE, BEING SO FIGETY OR RESTLESS THAT YOU HAVE BEEN MOVING AROUND A LOT MORE THAN USUAL: 0
7. TROUBLE CONCENTRATING ON THINGS, SUCH AS READING THE NEWSPAPER OR WATCHING TELEVISION: 1
6. FEELING BAD ABOUT YOURSELF - OR THAT YOU ARE A FAILURE OR HAVE LET YOURSELF OR YOUR FAMILY DOWN: 0
2. FEELING DOWN, DEPRESSED OR HOPELESS: 0
SUM OF ALL RESPONSES TO PHQ QUESTIONS 1-9: 7
1. LITTLE INTEREST OR PLEASURE IN DOING THINGS: 0
SUM OF ALL RESPONSES TO PHQ QUESTIONS 1-9: 7
SUM OF ALL RESPONSES TO PHQ QUESTIONS 1-9: 7
5. POOR APPETITE OR OVEREATING: 0
9. THOUGHTS THAT YOU WOULD BE BETTER OFF DEAD, OR OF HURTING YOURSELF: 0
10. IF YOU CHECKED OFF ANY PROBLEMS, HOW DIFFICULT HAVE THESE PROBLEMS MADE IT FOR YOU TO DO YOUR WORK, TAKE CARE OF THINGS AT HOME, OR GET ALONG WITH OTHER PEOPLE: 1
3. TROUBLE FALLING OR STAYING ASLEEP: 3
4. FEELING TIRED OR HAVING LITTLE ENERGY: 3
SUM OF ALL RESPONSES TO PHQ QUESTIONS 1-9: 7

## 2023-01-27 ASSESSMENT — SOCIAL DETERMINANTS OF HEALTH (SDOH): HOW HARD IS IT FOR YOU TO PAY FOR THE VERY BASICS LIKE FOOD, HOUSING, MEDICAL CARE, AND HEATING?: NOT HARD AT ALL

## 2023-01-27 ASSESSMENT — ENCOUNTER SYMPTOMS
WHEEZING: 0
SHORTNESS OF BREATH: 0
COUGH: 0
CHEST TIGHTNESS: 0

## 2023-01-27 NOTE — PROGRESS NOTES
1/27/23     Chief Complaint   Patient presents with    Orders     Needs and order for a new CPAP machine. Previous one is broken. Not seen since 6/3/21    Annual Exam     Fasting for labs - biometric form brought in today      HPI    Here for annual exam and follow up  He has not been seen in a few years  Fasting today   Denies concerns today other than he needs a new cpap   He had a cpap machine that broke and insurance told him he can get a new machine, will not repair old machine. Allergies   Allergen Reactions    Pcn [Penicillins]        No current outpatient medications on file. No current facility-administered medications for this visit. Review of Systems   Constitutional:  Negative for chills and fever. Respiratory:  Negative for cough, chest tightness, shortness of breath and wheezing. Cardiovascular:  Negative for chest pain, palpitations and leg swelling. Neurological:  Negative for dizziness, tremors, light-headedness and headaches. Vitals:    01/27/23 0847   BP: 128/82   Pulse: 76   SpO2: 97%   Weight: 275 lb (124.7 kg)   Height: 5' 11\" (1.803 m)      Physical Exam  Constitutional:       General: He is not in acute distress. Appearance: Normal appearance. He is not ill-appearing. HENT:      Head: Normocephalic and atraumatic. Cardiovascular:      Rate and Rhythm: Normal rate and regular rhythm. Pulmonary:      Effort: Pulmonary effort is normal. No respiratory distress. Breath sounds: Normal breath sounds. Neurological:      Mental Status: He is alert and oriented to person, place, and time. Mental status is at baseline. Psychiatric:         Mood and Affect: Mood normal.         Behavior: Behavior normal.     Assessment/Plan:  1. Encounter for well adult exam without abnormal findings  Overall doing well. Fasting labs today. Biometric screening forms will be complete when labs resulted. - Lipid Panel; Future  - Basic Metabolic Panel; Future    2.  VANESSA on CPAP  Chronic, uncontrolled d/t broken cpap. Last sleep study was in 2021. He has not seen Dr. Evan Christiansen / sleep medicine since. Referral placed for him to schedule follow up appt. - Renato Lewis MD, Sleep Medicine, [facility]    3. Anxiety and depression  Chronic, stable, controlled without medication. 4. Class 3 severe obesity due to excess calories without serious comorbidity with body mass index (BMI) of 40.0 to 44.9 in adult [facility])  Chronic, improving with healthy diet/ exercise     5. Elevated lipoprotein(a)  Chronic, due for lab work. - Lipid Panel; Future  - Basic Metabolic Panel; Future    6. ACP (advance care planning)  Written information provided on avs     Discussed medications with patient, who voiced understanding of their use and indications. All questions answered. Return in 1 year (on 1/27/2024), or if symptoms worsen or fail to improve.       Electronically signed by ERIKA Hopson CNP on 1/27/2023 at 9:17 AM

## 2023-01-27 NOTE — PATIENT INSTRUCTIONS
Advance Directives: Care Instructions  Overview  An advance directive is a legal way to state your wishes at the end of your life. It tells your family and your doctor what to do if you can't say what you want. There are two main types of advance directives. You can change them any time your wishes change. Living will. This form tells your family and your doctor your wishes about life support and other treatment. The form is also called a declaration. Medical power of . This form lets you name a person to make treatment decisions for you when you can't speak for yourself. This person is called a health care agent (health care proxy, health care surrogate). The form is also called a durable power of  for health care. If you do not have an advance directive, decisions about your medical care may be made by a family member, or by a doctor or a  who doesn't know you. It may help to think of an advance directive as a gift to the people who care for you. If you have one, they won't have to make tough decisions by themselves. For more information, including forms for your state, see the [address] website (www.caringinfo.org/planning/advance-directives/). Follow-up care is a key part of your treatment and safety. Be sure to make and go to all appointments, and call your doctor if you are having problems. It's also a good idea to know your test results and keep a list of the medicines you take. What should you include in an advance directive? Many states have a unique advance directive form. (It may ask you to address specific issues.) Or you might use a universal form that's approved by many states. If your form doesn't tell you what to address, it may be hard to know what to include in your advance directive. Use the questions below to help you get started. Who do you want to make decisions about your medical care if you are not able to?   What life-support measures do you want if you have a serious illness that gets worse over time or can't be cured? What are you most afraid of that might happen? (Maybe you're afraid of having pain, losing your independence, or being kept alive by machines.)  Where would you prefer to die? (Your home? A hospital? A nursing home?)  Do you want to donate your organs when you die? Do you want certain Confucianist practices performed before you die? When should you call for help? Be sure to contact your doctor if you have any questions. Where can you learn more? Go to http://www.patel.com/ and enter R264 to learn more about \"Advance Directives: Care Instructions. \"  Current as of: June 16, 2022               Content Version: 13.5  © 2006-2022 Healthwise, Incorporated. Care instructions adapted under license by [facility] ([facility]). If you have questions about a medical condition or this instruction, always ask your healthcare professional. [place] [address] any warranty or liability for your use of this information.

## 2023-03-13 ENCOUNTER — OFFICE VISIT (OUTPATIENT)
Dept: PULMONOLOGY | Age: 28
End: 2023-03-13
Payer: OTHER GOVERNMENT

## 2023-03-13 VITALS
HEIGHT: 71 IN | DIASTOLIC BLOOD PRESSURE: 78 MMHG | HEART RATE: 93 BPM | BODY MASS INDEX: 40.07 KG/M2 | WEIGHT: 286.2 LBS | OXYGEN SATURATION: 97 % | SYSTOLIC BLOOD PRESSURE: 118 MMHG

## 2023-03-13 DIAGNOSIS — Z99.89 OSA ON CPAP: Chronic | ICD-10-CM

## 2023-03-13 DIAGNOSIS — G47.33 OSA ON CPAP: Chronic | ICD-10-CM

## 2023-03-13 DIAGNOSIS — E66.01 CLASS 2 SEVERE OBESITY DUE TO EXCESS CALORIES WITH SERIOUS COMORBIDITY AND BODY MASS INDEX (BMI) OF 39.0 TO 39.9 IN ADULT (HCC): Chronic | ICD-10-CM

## 2023-03-13 PROBLEM — E66.812 CLASS 2 SEVERE OBESITY DUE TO EXCESS CALORIES WITH SERIOUS COMORBIDITY AND BODY MASS INDEX (BMI) OF 39.0 TO 39.9 IN ADULT: Chronic | Status: ACTIVE | Noted: 2020-11-11

## 2023-03-13 PROCEDURE — 99214 OFFICE O/P EST MOD 30 MIN: CPT | Performed by: INTERNAL MEDICINE

## 2023-03-13 ASSESSMENT — SLEEP AND FATIGUE QUESTIONNAIRES
HOW LIKELY ARE YOU TO NOD OFF OR FALL ASLEEP WHILE SITTING AND TALKING TO SOMEONE: 0
HOW LIKELY ARE YOU TO NOD OFF OR FALL ASLEEP WHEN YOU ARE A PASSENGER IN A CAR FOR AN HOUR WITHOUT A BREAK: 2
HOW LIKELY ARE YOU TO NOD OFF OR FALL ASLEEP WHILE SITTING QUIETLY AFTER LUNCH WITHOUT ALCOHOL: 1
ESS TOTAL SCORE: 11
HOW LIKELY ARE YOU TO NOD OFF OR FALL ASLEEP WHILE SITTING INACTIVE IN A PUBLIC PLACE: 3
HOW LIKELY ARE YOU TO NOD OFF OR FALL ASLEEP WHILE LYING DOWN TO REST IN THE AFTERNOON WHEN CIRCUMSTANCES PERMIT: 1
HOW LIKELY ARE YOU TO NOD OFF OR FALL ASLEEP WHILE SITTING AND READING: 2
HOW LIKELY ARE YOU TO NOD OFF OR FALL ASLEEP IN A CAR, WHILE STOPPED FOR A FEW MINUTES IN TRAFFIC: 1
HOW LIKELY ARE YOU TO NOD OFF OR FALL ASLEEP WHILE WATCHING TV: 1

## 2023-03-13 NOTE — ASSESSMENT & PLAN NOTE
Chronic-with progression/exacerbation:  Continue medications per his PCP and other physicians. Instructed not to drive unless had 4 hrs of effective therapy for his VANESSA the night before. Did review the risks of under or untreated VANESSA including, but not limited to, higher risks of motor vehicle accidents, stroke, heart attacks, and death. He understands and accepts all these risks. Filter which cleaned today in the office and the patient will restart using his machine. We will change the pressure range to 14-20. We will have him get new supplies and a prescription company and relook at the data next 3 to 4 months. His machine is not working with the filter being clean he will need to check with his DME company about a warrantee exchange. Needs overnight oximetry on APAP (insurance mandated) even though standard of care is to do in-lab titration.

## 2023-03-13 NOTE — PROGRESS NOTES
Patricio Zaragoza         : 1995    Diagnosis: [x] Hypoxia (R09.02) [] CSA (G47.31) [] Apnea (G47.30)   Length of Need: [] 13 Months [] 99 Months [] Other:    Machine (ARI!): [] Respironics Dream Station      Auto [] ResMed AirSense     Auto [] Other:     []  CPAP () [] Bilevel ()   Mode: [] Auto [] Spontaneous    Mode: [] Auto [] Spontaneous              Comfort Settings:        Humidifier: [] Heated ()        [] Water chamber replacement ()/ 1 per 6 months        Mask:   [] Nasal () /1 per 3 months [] Full Face () /1 per 3 months   [] Patient choice -Size and fit mask [] Patient Choice - Size and fit mask   [] Dispense:  [] Dispense:    [] Headgear () / 1 per 3 months [] Headgear () / 1 per 3 months   [] Replacement Nasal Cushion ()/2 per month [] Interface Replacement ()/1 per month   [] Replacement Nasal Pillows ()/2 per month         Tubing: [] Heated ()/1 per 3 months    [] Standard ()/1 per 3 months [] Other:           Filters: [] Non-disposable ()/1 per 6 months     [] Ultra-Fine, Disposable ()/2 per month        Miscellaneous: [] Chin Strap ()/ 1 per 6 months [] O2 bleed-in:       LPM   [x] Overnight Oximetry on CPAP/Bilevel []  Other:    [] Modem: ()         Start Order Date: 23    MEDICAL JUSTIFICATION:  I, the undersigned, certify that the above prescribed supplies are medically necessary for this patients wellbeing. In my opinion, the supplies are both reasonable and necessary in reference to accepted standards of medicalpractice in treatment of this patients condition.     Chiquita Smalls MD      NPI: [id number]       Order Signed Date: 23    Electronically signed by Chiquita Smalls MD on 3/13/2023 at 1:29 PM    Patricio Zaragoza  1995  [place]  [address] [facility]  [phone] (home)   [phone] (mobile)      Insurance Info (confirm with patient if correct):  Payer/Plan Subscr  Sex Relation Sub.  Ins. ID Effective Group Num

## 2023-03-13 NOTE — PROGRESS NOTES
Yakelin Becerra CNP  Madalyn Blasonur [address] [facility], [address] [facility] [phone]   Patrice Chavez  [address]  [place], [address] [facility]20 [phone] [address] [address] [phone]      Assessment/Plan:      1. VANESSA on CPAP  Assessment & Plan:  Chronic-with progression/exacerbation:  Continue medications per his PCP and other physicians. Instructed not to drive unless had 4 hrs of effective therapy for his VANESSA the night before. Did review the risks of under or untreated VANESSA including, but not limited to, higher risks of motor vehicle accidents, stroke, heart attacks, and death. He understands and accepts all these risks. Filter which cleaned today in the office and the patient will restart using his machine. We will change the pressure range to 14-20. We will have him get new supplies and a prescription company and relook at the data next 3 to 4 months. His machine is not working with the filter being clean he will need to check with his DME company about a warrantee exchange. Needs overnight oximetry on APAP (insurance mandated) even though standard of care is to do in-lab titration. 2. Class 2 severe obesity due to excess calories with serious comorbidity and body mass index (BMI) of 39.0 to 39.9 in adult [facility])  Assessment & Plan:  Chronic-not stable:  Discussed importance of treating obstructive sleep apnea and getting sufficient sleep to assist with weight control. Encouraged him to work on weight loss through diet and exercise. Recommended DASH or Mediterranean diets. Reviewed, analyzed, and documented physiologic data from patient's PAP machine. This information was analyzed to assess complexity and medical decision making in regards to further testing and management.     Diagnoses of VANESSA on CPAP and Class 2 severe obesity due to excess calories with serious comorbidity and body mass index (BMI) of 39.0 to 39.9 in adult ([facility]) were pertinent to this visit.  The chronic medical conditions listed are directly related to the primary diagnosis listed above.  The management of the primary diagnosis affects the secondary diagnosis and vice versa.     Subjective:   Subjective   Patient ID: Tod Og is a 27 y.o. male.    Chief Complaint   Patient presents with    Sleep Apnea       HPI:  Machine Modem/Download Info:  Compliance (hours/night): 3.2 hrs/night  % of nights >= 4 hrs: 44 %  Download AHI (/hour): 3 /HR  Average CPAP Pressure : 16.9 cmH2O   APAP - Settings  Pressure Min: 7 cmH2O  Pressure Max: 17 cmH2O                 Comfort Settings  Humidity Level (0-8): 4  Heated Tubing (Yes/No): Yes  Flex/EPR (0-3): 3       Had insurance mandated HST on 4/27/21 which showed and RICHARD-60.4/hr and low sat-64% with time below 88% of 67.2 min.    Never followed up after getting his machine back in 2021.  He felt he was doing well with his machine, sleeping better, his energy was controlled.  A few months ago his machine stopped pushing air so he stopped using his machine his symptoms have returned and are worse.  He has not contacted the equipment company is not sure if he needs a new machine.  Machine was checked in the office today and it was not pushing any airflow but after pulling out the filters the machine was pushing adequate airflow.  It appears the filter was packed for some kind of white powder which patient then recalled was being used to check for bugs and other issues in their house.    [facility]    [place] Sleepiness Score: 11    Social History     Socioeconomic History    Marital status: Single     Spouse name: Not on file    Number of children: Not on file    Years of education: Not on file    Highest education level: Not on file   Occupational History    Not on file   Tobacco Use  Smoking status: Never    Smokeless tobacco: Never   Substance and Sexual Activity    Alcohol use: No     Alcohol/week: 0.0 standard drinks     Comment: none    Drug use: No    Sexual activity: Not Currently     Comment: single    Other Topics Concern    Not on file   Social History Narrative    Not on file     Social Determinants of Health     Financial Resource Strain: Low Risk     Difficulty of Paying Living Expenses: Not hard at all   Food Insecurity: No Food Insecurity    Worried About Running Out of Food in the Last Year: Never true    Ran Out of Food in the Last Year: Never true   Transportation Needs: Not on file   Physical Activity: Not on file   Stress: Not on file   Social Connections: Not on file   Intimate Partner Violence: Not on file   Housing Stability: Not on file       Current Outpatient Medications   Medication Instructions    atorvastatin (LIPITOR) 20 mg, Oral, DAILY          Vitals:  Weight BMI   Wt Readings from Last 3 Encounters:   03/13/23 286 lb 3.2 oz (129.8 kg)   01/27/23 275 lb (124.7 kg)   06/03/21 287 lb (130.2 kg)    Body mass index is 39.92 kg/m².      BP HR SaO2   BP Readings from Last 3 Encounters:   03/13/23 118/78   01/27/23 128/82   06/03/21 128/86    Pulse Readings from Last 3 Encounters:   03/13/23 93   01/27/23 76   06/03/21 80    SpO2 Readings from Last 3 Encounters:   03/13/23 97%   01/27/23 97%   04/15/21 95%        Electronically signed by Alyson Urban MD on 3/13/2023 at 1:32 PM

## 2023-03-13 NOTE — PROGRESS NOTES
[place] Gallardo         : 1995    Diagnosis: [x] VANESSA (G47.33) [] CSA (G47.31) [] Apnea (G47.30)   Length of Need: [x] 18 Months [] 99 Months [] Other:    Machine (ARI!): [] Respironics Dream Station      Auto [] ResMed AirSense     Auto [] Other:     []  CPAP () [] Bilevel ()   Mode: [] Auto [] Spontaneous    Mode: [] Auto [] Spontaneous            Comfort Settings:        Humidifier: [] Heated ()        [x] Water chamber replacement ()/ 1 per 6 months        Mask:   [x] Nasal () /1 per 3 months [x] Full Face () /1 per 3 months   [] Patient choice -Size and fit mask [x] Patient Choice - Size and fit mask   [] Dispense:  [] Dispense:    [] Headgear () / 1 per 3 months [x] Headgear () / 1 per 3 months   [] Replacement Nasal Cushion ()/2 per month [x] Interface Replacement ()/1 per month   [] Replacement Nasal Pillows ()/2 per month         Tubing: [] Heated ()/1 per 3 months    [x] Standard ()/1 per 3 months [] Other:           Filters: [x] Non-disposable ()/1 per 6 months     [x] Ultra-Fine, Disposable ()/2 per month        Miscellaneous: [] Chin Strap ()/ 1 per 6 months [] O2 bleed-in:       LPM   [] Oximetry on CPAP/Bilevel []  Other:    [x] Modem: ()         Start Order Date: 23    MEDICAL JUSTIFICATION:  I, the undersigned, certify that the above prescribed supplies are medically necessary for this patients wellbeing. In my opinion, the supplies are both reasonable and necessary in reference to accepted standards of medicalpractice in treatment of this patients condition.     Colt Johnson MD      NPI: [id number]        Order Signed Date: 23    Electronically signed by Colt Johnson MD on 3/13/2023 at 1:29 PM    Floridacallum Gallardo  1995  [place]  [address]  [phone] (home)   [phone] (mobile)      Insurance Info (confirm with patient if correct):  Payer/Plan Subscr  Sex Relation Sub.  Ins. ID Effective Group Num

## 2023-03-13 NOTE — LETTER
March 13, 2023      Kristian Holm[address] [address]      Patient: Luci Walter   MR Number: [id number]   YOB: 1995   Date of Visit: 3/13/2023       Dear Kristian Holm: Thank you for referring Luci Walter to me for evaluation/treatment. Below are the relevant portions of my assessment and plan of care. 1. VANESSA on CPAP  Assessment & Plan:  Chronic-with progression/exacerbation:  Continue medications per his PCP and other physicians. Instructed not to drive unless had 4 hrs of effective therapy for his VANESSA the night before. Did review the risks of under or untreated VANESSA including, but not limited to, higher risks of motor vehicle accidents, stroke, heart attacks, and death. He understands and accepts all these risks. Filter which cleaned today in the office and the patient will restart using his machine. We will change the pressure range to 14-20. We will have him get new supplies and a prescription company and relook at the data next 3 to 4 months. His machine is not working with the filter being clean he will need to check with his DME company about a warrantee exchange. Needs overnight oximetry on APAP (insurance mandated) even though standard of care is to do in-lab titration. 2. Class 2 severe obesity due to excess calories with serious comorbidity and body mass index (BMI) of 39.0 to 39.9 in adult [facility])  Assessment & Plan:  Chronic-not stable:  Discussed importance of treating obstructive sleep apnea and getting sufficient sleep to assist with weight control. Encouraged him to work on weight loss through diet and exercise. Recommended DASH or Mediterranean diets. Reviewed, analyzed, and documented physiologic data from patient's PAP machine. This information was analyzed to assess complexity and medical decision making in regards to further testing and management.     Diagnoses of VANESSA on CPAP and Class 2 severe obesity due to excess calories with serious comorbidity and body mass index (BMI) of 39.0 to 39.9 in adult [facility]) were pertinent to this visit. The chronic medical conditions listed are directly related to the primary diagnosis listed above. The management of the primary diagnosis affects the secondary diagnosis and vice versa. If you have questions, please do not hesitate to call me. I look forward to following Tod along with you.     Sincerely,        Carlos A Mckeon MD

## 2023-09-20 ENCOUNTER — OFFICE VISIT (OUTPATIENT)
Dept: INTERNAL MEDICINE CLINIC | Age: 28
End: 2023-09-20
Payer: OTHER GOVERNMENT

## 2023-09-20 VITALS
DIASTOLIC BLOOD PRESSURE: 78 MMHG | OXYGEN SATURATION: 97 % | HEART RATE: 90 BPM | WEIGHT: 261.6 LBS | SYSTOLIC BLOOD PRESSURE: 120 MMHG | BODY MASS INDEX: 36.49 KG/M2

## 2023-09-20 DIAGNOSIS — R73.09 ELEVATED GLUCOSE: ICD-10-CM

## 2023-09-20 DIAGNOSIS — L30.9 DERMATITIS: Primary | ICD-10-CM

## 2023-09-20 DIAGNOSIS — E78.49 FAMILIAL HYPERLIPIDEMIA: ICD-10-CM

## 2023-09-20 DIAGNOSIS — L60.0 INGROWN NAIL OF THIRD TOE OF RIGHT FOOT: ICD-10-CM

## 2023-09-20 PROCEDURE — 99214 OFFICE O/P EST MOD 30 MIN: CPT | Performed by: INTERNAL MEDICINE

## 2023-09-20 RX ORDER — DOXYCYCLINE HYCLATE 100 MG
100 TABLET ORAL 2 TIMES DAILY
Qty: 20 TABLET | Refills: 0 | Status: SHIPPED | OUTPATIENT
Start: 2023-09-20 | End: 2023-09-30

## 2023-09-20 RX ORDER — CLOTRIMAZOLE AND BETAMETHASONE DIPROPIONATE 10; .64 MG/G; MG/G
CREAM TOPICAL
Qty: 45 G | Refills: 1 | Status: SHIPPED | OUTPATIENT
Start: 2023-09-20

## 2023-09-20 ASSESSMENT — ENCOUNTER SYMPTOMS
VOMITING: 0
ABDOMINAL PAIN: 0
VOICE CHANGE: 0
SHORTNESS OF BREATH: 0
TROUBLE SWALLOWING: 0
NAUSEA: 0
WHEEZING: 0

## 2023-09-20 NOTE — PROGRESS NOTES
Lipid, Fasting; Future  -     Hemoglobin A1C; Future    Familial hyperlipidemia  Continue diet lifestyle changes. If his LDL above 190, we will start him on  -     Lipid,  fasting; Future  -     TSH with Reflex; Future          Orders Placed This Encounter   Procedures    Lipid, Fasting     Standing Status:   Future     Standing Expiration Date:   9/20/2024    Hemoglobin A1C     Standing Status:   Future     Standing Expiration Date:   9/20/2024    TSH with Reflex     Standing Status:   Future     Standing Expiration Date:   9/20/2024     Current Outpatient Medications   Medication Sig Dispense Refill    doxycycline hyclate (VIBRA-TABS) 100 MG tablet Take 1 tablet by mouth 2 times daily for 10 days 20 tablet 0    clotrimazole-betamethasone (LOTRISONE) 1-0.05 % cream Apply topically 2 times daily for 4 weeks 45 g 1    atorvastatin (LIPITOR) 20 MG tablet Take 1 tablet by mouth daily (Patient not taking: Reported on 9/20/2023) 30 tablet 3     No current facility-administered medications for this visit. Return in about 4 weeks (around 10/18/2023). An After Visit Summary was printed and given to the patient. Documentation was done using voice recognition dragon software. Every effort was made to ensure accuracy; however, inadvertent  Unintentional computerized transcription errors may be present.

## 2023-09-25 DIAGNOSIS — E78.49 FAMILIAL HYPERLIPIDEMIA: ICD-10-CM

## 2023-09-25 DIAGNOSIS — R73.09 ELEVATED GLUCOSE: ICD-10-CM

## 2023-09-26 DIAGNOSIS — E78.41 ELEVATED LIPOPROTEIN(A): ICD-10-CM

## 2023-09-26 DIAGNOSIS — E11.9 TYPE 2 DIABETES MELLITUS WITHOUT COMPLICATION, WITHOUT LONG-TERM CURRENT USE OF INSULIN (HCC): Primary | ICD-10-CM

## 2023-09-26 DIAGNOSIS — R73.09 ELEVATED GLUCOSE: ICD-10-CM

## 2023-09-26 LAB
CHOLEST SERPL-MCNC: 301 MG/DL (ref 0–199)
EST. AVERAGE GLUCOSE BLD GHB EST-MCNC: 266.1 MG/DL
HBA1C MFR BLD: 10.9 %
HDLC SERPL-MCNC: 39 MG/DL (ref 40–60)
LDL CHOLESTEROL CALCULATED: 210 MG/DL
TRIGL SERPL-MCNC: 259 MG/DL (ref 0–150)
TSH SERPL DL<=0.005 MIU/L-ACNC: 1.23 UIU/ML (ref 0.27–4.2)
VLDLC SERPL CALC-MCNC: 52 MG/DL

## 2023-09-26 RX ORDER — ATORVASTATIN CALCIUM 20 MG/1
20 TABLET, FILM COATED ORAL DAILY
Qty: 30 TABLET | Refills: 3 | Status: SHIPPED | OUTPATIENT
Start: 2023-09-26

## 2023-09-26 RX ORDER — GLIMEPIRIDE 2 MG/1
2 TABLET ORAL EVERY MORNING
Qty: 30 TABLET | Refills: 3 | Status: SHIPPED | OUTPATIENT
Start: 2023-09-26

## 2023-09-26 RX ORDER — METFORMIN HYDROCHLORIDE 500 MG/1
1000 TABLET, EXTENDED RELEASE ORAL
Qty: 180 TABLET | Refills: 1 | Status: SHIPPED | OUTPATIENT
Start: 2023-09-26

## 2023-09-26 NOTE — RESULT ENCOUNTER NOTE
Hemoglobin A1c 10.9 which means average blood glucose around 266  His cholesterol is also  elevated. Make sure patient keep appointment as a schedule  Like to start him on medications metformin,amaryl, atorvastatin, diabetic education.

## 2023-10-19 ENCOUNTER — OFFICE VISIT (OUTPATIENT)
Dept: INTERNAL MEDICINE CLINIC | Age: 28
End: 2023-10-19
Payer: OTHER GOVERNMENT

## 2023-10-19 VITALS
DIASTOLIC BLOOD PRESSURE: 80 MMHG | HEART RATE: 87 BPM | SYSTOLIC BLOOD PRESSURE: 126 MMHG | BODY MASS INDEX: 36.82 KG/M2 | OXYGEN SATURATION: 98 % | WEIGHT: 264 LBS

## 2023-10-19 DIAGNOSIS — E78.49 FAMILIAL HYPERLIPIDEMIA: Primary | ICD-10-CM

## 2023-10-19 DIAGNOSIS — K59.00 CONSTIPATION, UNSPECIFIED CONSTIPATION TYPE: ICD-10-CM

## 2023-10-19 DIAGNOSIS — E11.9 TYPE 2 DIABETES MELLITUS WITHOUT COMPLICATION, WITHOUT LONG-TERM CURRENT USE OF INSULIN (HCC): ICD-10-CM

## 2023-10-19 DIAGNOSIS — K64.9 HEMORRHOIDS, UNSPECIFIED HEMORRHOID TYPE: ICD-10-CM

## 2023-10-19 PROCEDURE — 3046F HEMOGLOBIN A1C LEVEL >9.0%: CPT | Performed by: INTERNAL MEDICINE

## 2023-10-19 PROCEDURE — 99214 OFFICE O/P EST MOD 30 MIN: CPT | Performed by: INTERNAL MEDICINE

## 2023-10-19 RX ORDER — HYDROCORTISONE 25 MG/G
CREAM TOPICAL 2 TIMES DAILY
Qty: 28 G | Refills: 2 | Status: SHIPPED | OUTPATIENT
Start: 2023-10-19

## 2023-10-19 RX ORDER — POLYETHYLENE GLYCOL 3350 17 G/17G
17 POWDER, FOR SOLUTION ORAL DAILY
Qty: 1530 G | Refills: 1 | Status: SHIPPED | OUTPATIENT
Start: 2023-10-19 | End: 2023-11-18

## 2023-10-19 SDOH — ECONOMIC STABILITY: INCOME INSECURITY: HOW HARD IS IT FOR YOU TO PAY FOR THE VERY BASICS LIKE FOOD, HOUSING, MEDICAL CARE, AND HEATING?: NOT VERY HARD

## 2023-10-19 SDOH — ECONOMIC STABILITY: FOOD INSECURITY: WITHIN THE PAST 12 MONTHS, YOU WORRIED THAT YOUR FOOD WOULD RUN OUT BEFORE YOU GOT MONEY TO BUY MORE.: NEVER TRUE

## 2023-10-19 SDOH — ECONOMIC STABILITY: HOUSING INSECURITY
IN THE LAST 12 MONTHS, WAS THERE A TIME WHEN YOU DID NOT HAVE A STEADY PLACE TO SLEEP OR SLEPT IN A SHELTER (INCLUDING NOW)?: NO

## 2023-10-19 SDOH — ECONOMIC STABILITY: FOOD INSECURITY: WITHIN THE PAST 12 MONTHS, THE FOOD YOU BOUGHT JUST DIDN'T LAST AND YOU DIDN'T HAVE MONEY TO GET MORE.: NEVER TRUE

## 2023-10-19 ASSESSMENT — ENCOUNTER SYMPTOMS
SHORTNESS OF BREATH: 0
VOMITING: 0
ABDOMINAL PAIN: 0
NAUSEA: 0
PHOTOPHOBIA: 0
WHEEZING: 0

## 2023-10-19 NOTE — PROGRESS NOTES
topically 2 times daily for 4 weeks 45 g 1     No current facility-administered medications for this visit. Return in about 3 months (around 1/19/2024). An After Visit Summary was printed and given to the patient. Documentation was done using voice recognition dragon software. Every effort was made to ensure accuracy; however, inadvertent  Unintentional computerized transcription errors may be present.

## 2023-10-20 DIAGNOSIS — E11.9 TYPE 2 DIABETES MELLITUS WITHOUT COMPLICATION, WITHOUT LONG-TERM CURRENT USE OF INSULIN (HCC): Primary | ICD-10-CM

## 2023-10-30 DIAGNOSIS — E11.9 TYPE 2 DIABETES MELLITUS WITHOUT COMPLICATION, WITHOUT LONG-TERM CURRENT USE OF INSULIN (HCC): ICD-10-CM

## 2023-10-30 RX ORDER — GLIMEPIRIDE 2 MG/1
2 TABLET ORAL EVERY MORNING
Qty: 90 TABLET | Refills: 1 | Status: SHIPPED | OUTPATIENT
Start: 2023-10-30

## 2024-01-09 DIAGNOSIS — R73.09 ELEVATED GLUCOSE: ICD-10-CM

## 2024-01-09 DIAGNOSIS — E78.41 ELEVATED LIPOPROTEIN(A): ICD-10-CM

## 2024-01-09 DIAGNOSIS — E11.9 TYPE 2 DIABETES MELLITUS WITHOUT COMPLICATION, WITHOUT LONG-TERM CURRENT USE OF INSULIN (HCC): ICD-10-CM

## 2024-01-10 RX ORDER — ATORVASTATIN CALCIUM 20 MG/1
20 TABLET, FILM COATED ORAL DAILY
Qty: 90 TABLET | Refills: 1 | Status: SHIPPED | OUTPATIENT
Start: 2024-01-10

## 2024-01-19 ENCOUNTER — OFFICE VISIT (OUTPATIENT)
Dept: INTERNAL MEDICINE CLINIC | Age: 29
End: 2024-01-19
Payer: OTHER GOVERNMENT

## 2024-01-19 VITALS
HEART RATE: 95 BPM | SYSTOLIC BLOOD PRESSURE: 126 MMHG | OXYGEN SATURATION: 98 % | DIASTOLIC BLOOD PRESSURE: 78 MMHG | BODY MASS INDEX: 38.49 KG/M2 | WEIGHT: 276 LBS

## 2024-01-19 DIAGNOSIS — E11.9 TYPE 2 DIABETES MELLITUS WITHOUT COMPLICATION, WITHOUT LONG-TERM CURRENT USE OF INSULIN (HCC): Primary | ICD-10-CM

## 2024-01-19 DIAGNOSIS — E78.49 FAMILIAL HYPERLIPIDEMIA: ICD-10-CM

## 2024-01-19 DIAGNOSIS — M25.561 RECURRENT PAIN OF RIGHT KNEE: ICD-10-CM

## 2024-01-19 DIAGNOSIS — M22.2X1 PATELLOFEMORAL PAIN SYNDROME OF RIGHT KNEE: ICD-10-CM

## 2024-01-19 LAB — HBA1C MFR BLD: 5.9 %

## 2024-01-19 PROCEDURE — 3044F HG A1C LEVEL LT 7.0%: CPT | Performed by: INTERNAL MEDICINE

## 2024-01-19 PROCEDURE — 83036 HEMOGLOBIN GLYCOSYLATED A1C: CPT | Performed by: INTERNAL MEDICINE

## 2024-01-19 PROCEDURE — 99214 OFFICE O/P EST MOD 30 MIN: CPT | Performed by: INTERNAL MEDICINE

## 2024-01-19 RX ORDER — MELOXICAM 15 MG/1
15 TABLET ORAL DAILY
Qty: 30 TABLET | Refills: 0 | Status: SHIPPED | OUTPATIENT
Start: 2024-01-19

## 2024-01-19 ASSESSMENT — PATIENT HEALTH QUESTIONNAIRE - PHQ9
2. FEELING DOWN, DEPRESSED OR HOPELESS: 0
SUM OF ALL RESPONSES TO PHQ QUESTIONS 1-9: 0
SUM OF ALL RESPONSES TO PHQ9 QUESTIONS 1 & 2: 0
1. LITTLE INTEREST OR PLEASURE IN DOING THINGS: 0
6. FEELING BAD ABOUT YOURSELF - OR THAT YOU ARE A FAILURE OR HAVE LET YOURSELF OR YOUR FAMILY DOWN: 0
5. POOR APPETITE OR OVEREATING: 0
4. FEELING TIRED OR HAVING LITTLE ENERGY: 0
SUM OF ALL RESPONSES TO PHQ QUESTIONS 1-9: 0
3. TROUBLE FALLING OR STAYING ASLEEP: 0
7. TROUBLE CONCENTRATING ON THINGS, SUCH AS READING THE NEWSPAPER OR WATCHING TELEVISION: 0
8. MOVING OR SPEAKING SO SLOWLY THAT OTHER PEOPLE COULD HAVE NOTICED. OR THE OPPOSITE, BEING SO FIGETY OR RESTLESS THAT YOU HAVE BEEN MOVING AROUND A LOT MORE THAN USUAL: 0
SUM OF ALL RESPONSES TO PHQ QUESTIONS 1-9: 0
9. THOUGHTS THAT YOU WOULD BE BETTER OFF DEAD, OR OF HURTING YOURSELF: 0
10. IF YOU CHECKED OFF ANY PROBLEMS, HOW DIFFICULT HAVE THESE PROBLEMS MADE IT FOR YOU TO DO YOUR WORK, TAKE CARE OF THINGS AT HOME, OR GET ALONG WITH OTHER PEOPLE: 0
SUM OF ALL RESPONSES TO PHQ QUESTIONS 1-9: 0

## 2024-01-19 ASSESSMENT — ENCOUNTER SYMPTOMS
PHOTOPHOBIA: 0
VOMITING: 0
WHEEZING: 0
ABDOMINAL PAIN: 0
SHORTNESS OF BREATH: 0
NAUSEA: 0

## 2024-01-19 NOTE — PROGRESS NOTES
Tod Og  1995  male  28 y.o.    SUBJECTIVE:       Chief Complaint   Patient presents with    Follow-up     3 month f/u     Knee Problem     Right knee during his excersise top part of knee cap quad muscle pain when knee bends or walking up steps       HPI:    Follow-up visit for chronic problems.  Patient has not been monitoring his blood glucose.  Denies polyuria polydipsia polyphagia.  Since last visit patient did gain several pounds.   He denies tingling numbness affecting globs of stockings area.  Patient does not  get diabetic eye exam yearly.  Patient report he is taking Amaryl as well as metformin as prescribed.  He has not been taking Jardiance.    He has been complaining of gradual onset of pain at the right knee mostly suprapatellar area over the last several months get worse with physical activities as well as walking up steps.  He cannot recall any injury.  Patient take over-the-counter nonsteroidal anti-inflammatory drugs as needed    Past Medical History:   Diagnosis Date    VANESSA on CPAP 11/11/2020     History reviewed. No pertinent surgical history.  Social History     Socioeconomic History    Marital status: Single     Spouse name: None    Number of children: None    Years of education: None    Highest education level: None   Tobacco Use    Smoking status: Never    Smokeless tobacco: Never   Substance and Sexual Activity    Alcohol use: No     Alcohol/week: 0.0 standard drinks of alcohol     Comment: none    Drug use: No    Sexual activity: Not Currently     Comment: single      Social Determinants of Health     Financial Resource Strain: Low Risk  (10/19/2023)    Overall Financial Resource Strain (CARDIA)     Difficulty of Paying Living Expenses: Not very hard   Transportation Needs: Unknown (10/19/2023)    PRAPARE - Transportation     Lack of Transportation (Non-Medical): No   Housing Stability: Unknown (10/19/2023)    Housing Stability Vital Sign     Unstable Housing in the Last Year: No

## 2024-01-25 ENCOUNTER — TELEPHONE (OUTPATIENT)
Dept: INTERNAL MEDICINE CLINIC | Age: 29
End: 2024-01-25

## 2024-01-25 DIAGNOSIS — Z00.00 ENCOUNTER FOR WELL ADULT EXAM WITHOUT ABNORMAL FINDINGS: Primary | ICD-10-CM

## 2024-01-25 NOTE — TELEPHONE ENCOUNTER
Patient started a new job and does not have access to immunization records.  He asked about blood work to see if he has had Hepatitis B vaccine.  I did let him know that Dr Collado is out of the office and he asked if another provider could order test.

## 2024-02-28 DIAGNOSIS — M22.2X1 PATELLOFEMORAL PAIN SYNDROME OF RIGHT KNEE: ICD-10-CM

## 2024-02-28 RX ORDER — MELOXICAM 15 MG/1
15 TABLET ORAL DAILY
Qty: 90 TABLET | Refills: 1 | Status: SHIPPED | OUTPATIENT
Start: 2024-02-28

## 2024-03-11 NOTE — TELEPHONE ENCOUNTER
CHIEF COMPLAINT  Chief Complaint   Patient presents with   • Right Ankle - Pain   • Right Foot - Pain   • Office Visit       SUBJECTIVE  Eddie Joya is a 71yo male who presents to our office with c/o RIGHT ankle/foot pain. Pt states he broke his ankle in 2017. Since that time he has had some soreness on and off since that time. More recently pain has increased. No new trauma/injury. He has been participating in cardiac rehab for the last month and is unsure if this has contributed. No swelling. Pain is in his midfoot and along the lateral aspect of his ankle. Pt states he used to wear inserts but stopped 2 years ago when he got new shoes and the inserts did not fit comfortably inside. Pt states he wears slippers at home. He takes Tylenol for pain; unable to take NSAIDs secondary to begin on Plavix. He is accompanied by his wife today.     MEDICATIONS  Medications were reviewed and updated today.    HISTORIES  I have personally reviewed and updated the following EMR sections: Current medications, Allergies, Problem list, Past Medical History, Past Surgical History, Social History and Family History.  Please reference intake form.    REVIEW OF SYSTEMS  All other systems are reviewed and are negative except as documented in the HPI (History of Present Illness)    PHYSICAL EXAM  There were no vitals taken for this visit.   Constitutional:  Well developed, well nourished male in no acute distress.  Skin: Warm, dry, intact without rash or lesion.  Neurologic:  Alert & oriented x 3,  Normal gait.    Psychiatric:  Speech and behavior appropriate.  Musculoskeletal:     Right Ankle: Skin intact. No Ecchymosis. No Swelling Lateral. No Swelling Medial. AROM: DF = 3, PF = 25. No TTP Lateral Malleolus. No TTP Medial Malleolus. NoTTP ATFL. No TTP CFL. No TTP Deltoid. No TTP Achilles Tendon. No palpable Achilles defect. No TTP Anterior Ankle. Syndesmosis: No TTP Base of 5th Metatarsal. Positive Mid-Foot TTP. No  Pt calling hasn't heard from the sleep study place or the weight loss place---looks like you put referrals in but he hasn't heard from them yet---please call the pt. Thanks. proximal fibula TTP. Positive tenderness peroneal tendon.  NVI distally. Pes planus with pronation upon standing and ambulation.     IMAGING  I have reviewed the pertinent imaging study reports. These are the pertinent findings:  XRAY ANKLE 3 VW RIGHT  Right ankle reveal no acute fracture or dislocation.  Normal bony   trabecular pattern.  XR FOOT 3 OR MORE VIEWS RIGHT  3 views right foot reveal no acute fracture or dislocation.  Normal bony   trabecular pattern.  Pes planus noted with sagging noted at   naviculocuneiform joint.      ASSESSMENT/PLAN  Peroneal tendinitis of right lower extremity    - XR FOOT 3 OR MORE VIEWS RIGHT  - XRAY ANKLE 3 VW RIGHT  - SERVICE TO HOME CARE DME    Pes planus of both feet    - SERVICE TO HOME CARE DME     Radiographs reviewed.  Diagnosis reviewed with patient and all questions were answered.  Supportive shoe wear was discussed and encouraged.  Patient will investigate supportive shoes to wear inside his house.  He was given a prescription for semisoft custom orthoses.  He was given home exercises to begin doing on his own.  Tylenol as needed for pain.  He will follow-up in our office approximately 1 month after he gets his orthoses to review his progress.  Patient verbalized understanding and is in agreement this plan.    On 3/11/2024, Roxanna MOODY PA-C scribed the services personally performed by Kian Cai MD      The documentation recorded by the scribe, Roxanna Mayer, accurately reflects the service I personally performed and the decision made by me, Dr. Ga Cai.

## 2024-03-30 DIAGNOSIS — E11.9 TYPE 2 DIABETES MELLITUS WITHOUT COMPLICATION, WITHOUT LONG-TERM CURRENT USE OF INSULIN (HCC): ICD-10-CM

## 2024-04-01 RX ORDER — METFORMIN HYDROCHLORIDE 500 MG/1
1000 TABLET, EXTENDED RELEASE ORAL
Qty: 180 TABLET | Refills: 1 | Status: SHIPPED | OUTPATIENT
Start: 2024-04-01

## 2024-04-01 NOTE — TELEPHONE ENCOUNTER
Medication:   Requested Prescriptions     Pending Prescriptions Disp Refills    metFORMIN (GLUCOPHAGE-XR) 500 MG extended release tablet [Pharmacy Med Name: METFORMIN HCL  MG TABLET] 180 tablet 1     Sig: TAKE 2 TABLETS BY MOUTH DAILY (WITH BREAKFAST).        Last Filled:  9/26/23    Patient Phone Number: [phone] (home)     Last appt: 1/19/2024   Next appt: 4/9/2024    Last OARRS:        No data to display

## 2024-05-07 DIAGNOSIS — E11.9 TYPE 2 DIABETES MELLITUS WITHOUT COMPLICATION, WITHOUT LONG-TERM CURRENT USE OF INSULIN (HCC): ICD-10-CM

## 2024-05-07 RX ORDER — GLIMEPIRIDE 2 MG/1
2 TABLET ORAL EVERY MORNING
Qty: 90 TABLET | Refills: 1 | OUTPATIENT
Start: 2024-05-07

## 2024-05-31 DIAGNOSIS — E11.9 TYPE 2 DIABETES MELLITUS WITHOUT COMPLICATION, WITHOUT LONG-TERM CURRENT USE OF INSULIN (HCC): ICD-10-CM

## 2024-05-31 RX ORDER — GLIMEPIRIDE 2 MG/1
2 TABLET ORAL EVERY MORNING
Qty: 30 TABLET | Refills: 0 | Status: SHIPPED | OUTPATIENT
Start: 2024-05-31

## 2024-05-31 NOTE — TELEPHONE ENCOUNTER
Please call patient.  He is due for office visit.  No-show to last appointment.  I have sent 30-day supply

## 2024-07-03 DIAGNOSIS — E11.9 TYPE 2 DIABETES MELLITUS WITHOUT COMPLICATION, WITHOUT LONG-TERM CURRENT USE OF INSULIN (HCC): ICD-10-CM

## 2024-07-03 RX ORDER — GLIMEPIRIDE 2 MG/1
2 TABLET ORAL EVERY MORNING
Qty: 30 TABLET | Refills: 0 | OUTPATIENT
Start: 2024-07-03

## 2024-07-11 ENCOUNTER — OFFICE VISIT (OUTPATIENT)
Dept: INTERNAL MEDICINE CLINIC | Age: 29
End: 2024-07-11
Payer: OTHER GOVERNMENT

## 2024-07-11 VITALS
HEART RATE: 92 BPM | BODY MASS INDEX: 38.77 KG/M2 | SYSTOLIC BLOOD PRESSURE: 136 MMHG | OXYGEN SATURATION: 95 % | WEIGHT: 278 LBS | DIASTOLIC BLOOD PRESSURE: 94 MMHG

## 2024-07-11 DIAGNOSIS — R03.0 ELEVATED BLOOD PRESSURE READING WITHOUT DIAGNOSIS OF HYPERTENSION: ICD-10-CM

## 2024-07-11 DIAGNOSIS — R21 RASH AND NONSPECIFIC SKIN ERUPTION: Primary | ICD-10-CM

## 2024-07-11 DIAGNOSIS — E11.9 TYPE 2 DIABETES MELLITUS WITHOUT COMPLICATION, WITHOUT LONG-TERM CURRENT USE OF INSULIN (HCC): ICD-10-CM

## 2024-07-11 PROCEDURE — 3044F HG A1C LEVEL LT 7.0%: CPT | Performed by: INTERNAL MEDICINE

## 2024-07-11 PROCEDURE — 99214 OFFICE O/P EST MOD 30 MIN: CPT | Performed by: INTERNAL MEDICINE

## 2024-07-11 RX ORDER — METHYLPREDNISOLONE 4 MG/1
TABLET ORAL
Qty: 1 KIT | Refills: 0 | Status: SHIPPED | OUTPATIENT
Start: 2024-07-11 | End: 2024-07-17

## 2024-07-11 RX ORDER — GLIMEPIRIDE 2 MG/1
2 TABLET ORAL EVERY MORNING
Qty: 30 TABLET | Refills: 0 | Status: SHIPPED | OUTPATIENT
Start: 2024-07-11

## 2024-07-11 ASSESSMENT — ENCOUNTER SYMPTOMS
RHINORRHEA: 0
COUGH: 0
EYE PAIN: 0
SORE THROAT: 0
NAIL CHANGES: 0
VOMITING: 0
DIARRHEA: 0
SHORTNESS OF BREATH: 0

## 2024-07-11 NOTE — ASSESSMENT & PLAN NOTE
blood pressure checked twice remained borderline high diastolic, advised patient to attempt ambulatory blood pressure checks and follow-up with regular PCP in 4 weeks.  Maintain healthy low-salt low-fat diet with active lifestyle and attempt weight loss

## 2024-07-11 NOTE — ASSESSMENT & PLAN NOTE
patient with both macular and papular lesions spread throughout the body sparing face and genitalia.  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  Encouraged to continue use of protective gear, will do a trial of Medrol dose pack to see if this will help resolve the symptoms since it has been ongoing for almost 3 weeks now, advised if no improvement or worsening symptoms then he will need further evaluation and testing by an allergist or dermatology.  Itching is mild but can use as needed Benadryl or cetirizine once done with Medrol

## 2024-07-11 NOTE — ASSESSMENT & PLAN NOTE
patient with fairly well-controlled diabetes, cautioned that current use of methylprednisolone will result in elevation in blood sugars, reinforced recommendations to adhere to low-carb diet and monitor blood sugar closely while taking steroids, will refill 30 days of glimepiride however he is due to follow-up with Dr. Collado and have updated lab work.

## 2024-07-11 NOTE — PROGRESS NOTES
ASSESSMENT/PLAN:  1. Rash and nonspecific skin eruption  Assessment & Plan:    patient with both macular and papular lesions spread throughout the body sparing face and genitalia.  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  Encouraged to continue use of protective gear, will do a trial of Medrol dose pack to see if this will help resolve the symptoms since it has been ongoing for almost 3 weeks now, advised if no improvement or worsening symptoms then he will need further evaluation and testing by an allergist or dermatology.  Itching is mild but can use as needed Benadryl or cetirizine once done with Medrol  Orders:  -     methylPREDNISolone (MEDROL DOSEPACK) 4 MG tablet; Take by mouth., Disp-1 kit, R-0Normal  2. Type 2 diabetes mellitus without complication, without long-term current use of insulin ([facility])  Assessment & Plan:    patient with fairly well-controlled diabetes, cautioned that current use of methylprednisolone will result in elevation in blood sugars, reinforced recommendations to adhere to low-carb diet and monitor blood sugar closely while taking steroids, will refill 30 days of glimepiride however he is due to follow-up with Dr. Collado and have updated lab work.  Orders:  -     glimepiride (AMARYL) 2 MG tablet; Take 1 tablet by mouth every morning, Disp-30 tablet, R-0Need office visitNormal  3. Elevated blood pressure reading without diagnosis of hypertension  Assessment & Plan:    blood pressure checked twice remained borderline high diastolic, advised patient to attempt ambulatory blood pressure checks and follow-up with regular PCP in 4 weeks.  Maintain healthy low-salt low-fat diet with active lifestyle and attempt weight loss      Return in about 4 weeks (around 8/8/2024) for with Dr Collado.     SUBJECTIVE  HPI:   Patient here for an acute visit complaining of rash and skin eruption that started around June 25.  He does work with chemicals and reports

## 2024-08-09 ENCOUNTER — OFFICE VISIT (OUTPATIENT)
Dept: INTERNAL MEDICINE CLINIC | Age: 29
End: 2024-08-09
Payer: OTHER GOVERNMENT

## 2024-08-09 VITALS
DIASTOLIC BLOOD PRESSURE: 90 MMHG | OXYGEN SATURATION: 97 % | WEIGHT: 286.4 LBS | SYSTOLIC BLOOD PRESSURE: 150 MMHG | HEART RATE: 81 BPM | TEMPERATURE: 97.8 F | HEIGHT: 71 IN | BODY MASS INDEX: 40.1 KG/M2

## 2024-08-09 DIAGNOSIS — E11.9 TYPE 2 DIABETES MELLITUS WITHOUT COMPLICATION, WITHOUT LONG-TERM CURRENT USE OF INSULIN (HCC): Primary | ICD-10-CM

## 2024-08-09 DIAGNOSIS — E78.49 FAMILIAL HYPERLIPIDEMIA: ICD-10-CM

## 2024-08-09 DIAGNOSIS — R03.0 ELEVATED BLOOD PRESSURE READING WITHOUT DIAGNOSIS OF HYPERTENSION: ICD-10-CM

## 2024-08-09 LAB — HBA1C MFR BLD: 5.6 %

## 2024-08-09 PROCEDURE — 3044F HG A1C LEVEL LT 7.0%: CPT | Performed by: INTERNAL MEDICINE

## 2024-08-09 PROCEDURE — 90472 IMMUNIZATION ADMIN EACH ADD: CPT | Performed by: INTERNAL MEDICINE

## 2024-08-09 PROCEDURE — 90471 IMMUNIZATION ADMIN: CPT | Performed by: INTERNAL MEDICINE

## 2024-08-09 PROCEDURE — 83036 HEMOGLOBIN GLYCOSYLATED A1C: CPT | Performed by: INTERNAL MEDICINE

## 2024-08-09 PROCEDURE — 90715 TDAP VACCINE 7 YRS/> IM: CPT | Performed by: INTERNAL MEDICINE

## 2024-08-09 PROCEDURE — 99214 OFFICE O/P EST MOD 30 MIN: CPT | Performed by: INTERNAL MEDICINE

## 2024-08-09 PROCEDURE — 90677 PCV20 VACCINE IM: CPT | Performed by: INTERNAL MEDICINE

## 2024-08-09 ASSESSMENT — ENCOUNTER SYMPTOMS
SHORTNESS OF BREATH: 0
VOMITING: 0
WHEEZING: 0
VOICE CHANGE: 0
TROUBLE SWALLOWING: 0
ABDOMINAL PAIN: 0
NAUSEA: 0

## 2024-08-09 NOTE — PROGRESS NOTES
(10/19/2023)    PRAPARE - Transportation     Lack of Transportation (Non-Medical): No   Housing Stability: Unknown (10/19/2023)    Housing Stability Vital Sign     Unstable Housing in the Last Year: No     Family History   Problem Relation Age of Onset    Cancer Mother         breast    Diabetes Mother     Diabetes Maternal Grandmother        Review of Systems   Constitutional:  Negative for diaphoresis and fatigue.   HENT:  Negative for trouble swallowing and voice change.    Respiratory:  Negative for shortness of breath and wheezing.    Cardiovascular:  Negative for chest pain and palpitations.   Gastrointestinal:  Negative for abdominal pain, nausea and vomiting.   Endocrine: Negative for polyphagia and polyuria.   Neurological:  Negative for dizziness and light-headedness.       OBJECTIVE:  Pulse Readings from Last 4 Encounters:   08/09/24 81   07/11/24 92   01/19/24 95   10/19/23 87     Wt Readings from Last 4 Encounters:   08/09/24 129.9 kg (286 lb 6.4 oz)   07/11/24 126.1 kg (278 lb)   01/19/24 125.2 kg (276 lb)   10/19/23 119.7 kg (264 lb)     BP Readings from Last 4 Encounters:   08/09/24 (!) 150/90   07/11/24 (!) 136/94   01/19/24 126/78   10/19/23 126/80     Physical Exam  Vitals and nursing note reviewed.   Constitutional:       Appearance: Normal appearance. He is not ill-appearing.   Eyes:      Conjunctiva/sclera: Conjunctivae normal.   Cardiovascular:      Rate and Rhythm: Normal rate and regular rhythm.      Pulses: Normal pulses.      Heart sounds: Normal heart sounds.   Pulmonary:      Effort: Pulmonary effort is normal.      Breath sounds: Normal breath sounds. No wheezing.   Abdominal:      General: Bowel sounds are normal.   Musculoskeletal:      Right lower leg: No edema.      Left lower leg: No edema.   Neurological:      Mental Status: He is alert.         CBC:   Lab Results   Component Value Date/Time    WBC 6.2 11/12/2020 01:22 PM    HGB 14.8 11/12/2020 01:22 PM    HCT 44.4 11/12/2020

## 2024-08-12 DIAGNOSIS — E11.9 TYPE 2 DIABETES MELLITUS WITHOUT COMPLICATION, WITHOUT LONG-TERM CURRENT USE OF INSULIN (HCC): ICD-10-CM

## 2024-08-12 RX ORDER — GLIMEPIRIDE 2 MG/1
2 TABLET ORAL EVERY MORNING
Qty: 90 TABLET | Refills: 1 | Status: SHIPPED | OUTPATIENT
Start: 2024-08-12

## 2024-08-15 DIAGNOSIS — E11.9 TYPE 2 DIABETES MELLITUS WITHOUT COMPLICATION, WITHOUT LONG-TERM CURRENT USE OF INSULIN (HCC): ICD-10-CM

## 2024-08-15 DIAGNOSIS — R73.09 ELEVATED GLUCOSE: ICD-10-CM

## 2024-08-15 DIAGNOSIS — E78.41 ELEVATED LIPOPROTEIN(A): ICD-10-CM

## 2024-08-15 RX ORDER — ATORVASTATIN CALCIUM 20 MG/1
20 TABLET, FILM COATED ORAL DAILY
Qty: 90 TABLET | Refills: 1 | Status: SHIPPED | OUTPATIENT
Start: 2024-08-15

## 2024-10-03 ENCOUNTER — OFFICE VISIT (OUTPATIENT)
Dept: INTERNAL MEDICINE CLINIC | Age: 29
End: 2024-10-03
Payer: OTHER GOVERNMENT

## 2024-10-03 VITALS
DIASTOLIC BLOOD PRESSURE: 80 MMHG | OXYGEN SATURATION: 96 % | HEIGHT: 71 IN | WEIGHT: 284.4 LBS | BODY MASS INDEX: 39.81 KG/M2 | HEART RATE: 80 BPM | SYSTOLIC BLOOD PRESSURE: 128 MMHG

## 2024-10-03 DIAGNOSIS — L24.5 IRRITANT CONTACT DERMATITIS DUE TO OTHER CHEMICAL PRODUCTS: Primary | ICD-10-CM

## 2024-10-03 PROCEDURE — 99213 OFFICE O/P EST LOW 20 MIN: CPT

## 2024-10-03 RX ORDER — GAUZE BANDAGE 4" X 4"
1 BANDAGE TOPICAL DAILY
Qty: 10 EACH | Refills: 0 | Status: SHIPPED | OUTPATIENT
Start: 2024-10-03 | End: 2024-10-10

## 2024-10-03 RX ORDER — CLOBETASOL PROPIONATE 0.5 MG/G
OINTMENT TOPICAL 2 TIMES DAILY
Qty: 1 G | Refills: 0 | Status: SHIPPED | OUTPATIENT
Start: 2024-10-03 | End: 2024-10-10

## 2024-10-03 ASSESSMENT — ENCOUNTER SYMPTOMS
BACK PAIN: 0
SORE THROAT: 0
CONSTIPATION: 0
NAUSEA: 0
ABDOMINAL PAIN: 0
VOMITING: 0
WHEEZING: 0
CHEST TIGHTNESS: 0
SHORTNESS OF BREATH: 0
DIARRHEA: 0
COUGH: 0
TROUBLE SWALLOWING: 0

## 2024-10-03 NOTE — PROGRESS NOTES
Tod Og (:  1995) is a 29 y.o. male,Established patient, here for evaluation of the following chief complaint(s):  Rash (Bilateral feet, pt thinks may be exposure to chemical (pt states not workers comp))      Assessment & Plan   ASSESSMENT/PLAN:  Assessment & Plan  Irritant contact dermatitis due to other chemical products    Orders:    clobetasol (TEMOVATE) 0.05 % ointment; Apply topically 2 times daily for 7 days Apply topically 2 times daily.    Gauze Pads & Dressings (GAUZE DRESSING) 4\"X4\" PADS; 1 each by Does not apply route daily for 7 days             Patient presents for acute visit for blisters on bilateral feet which occurred after a chemical exposure at work.  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  Bilateral feet have developed small blisters with slight itchiness.  Some of the blisters have erupted from itching.  He had a similar reaction 2 times before, where the skin in the area had some discoloration and skin had peeled off.  This had healed on its own with supportive care with moisturizer.  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  He denies any symptoms including fevers, chills, nausea, vomiting.  Does have penicillin allergy but denies any other forms of allergy.  No recent penicillin exposure or any antibiotics use.  Does not report any new detergents, soaps, new foods etc.  No outdoor activities.  Patient instructed to seal off the opening of his shoes to prevent irritation from leaking it.  Patient also provided with a short course of topical steroids, apply moisturizer/emollients such as aloe vera or Aquaphor based products.  Patient also given a supply of gauze as well as instructions for contact dermatitis.     Patient was instructed to watch out for worsening symptoms including but

## 2024-10-15 DIAGNOSIS — E11.9 TYPE 2 DIABETES MELLITUS WITHOUT COMPLICATION, WITHOUT LONG-TERM CURRENT USE OF INSULIN (HCC): ICD-10-CM

## 2024-10-15 DIAGNOSIS — M22.2X1 PATELLOFEMORAL PAIN SYNDROME OF RIGHT KNEE: ICD-10-CM

## 2024-10-15 RX ORDER — MELOXICAM 15 MG/1
15 TABLET ORAL DAILY
Qty: 90 TABLET | Refills: 1 | Status: SHIPPED | OUTPATIENT
Start: 2024-10-15

## 2024-10-15 RX ORDER — METFORMIN HCL 500 MG
1000 TABLET, EXTENDED RELEASE 24 HR ORAL
Qty: 180 TABLET | Refills: 1 | Status: SHIPPED | OUTPATIENT
Start: 2024-10-15

## 2024-10-15 NOTE — TELEPHONE ENCOUNTER
Medication:   Requested Prescriptions     Pending Prescriptions Disp Refills    meloxicam (MOBIC) 15 MG tablet [Pharmacy Med Name: MELOXICAM 15 MG TABLET] 90 tablet 1     Sig: TAKE 1 TABLET BY MOUTH EVERY DAY    metFORMIN (GLUCOPHAGE-XR) 500 MG extended release tablet [Pharmacy Med Name: METFORMIN HCL  MG TABLET] 180 tablet 1     Sig: TAKE 2 TABLETS BY MOUTH DAILY (WITH BREAKFAST).        Last Filled:  2/28/24 and 4/1/24    Patient Phone Number: [phone] (home)     Last appt: 10/3/2024   Next appt: 10/18/2024    Last OARRS:        No data to display

## 2024-11-01 ENCOUNTER — OFFICE VISIT (OUTPATIENT)
Dept: INTERNAL MEDICINE CLINIC | Age: 29
End: 2024-11-01
Payer: OTHER GOVERNMENT

## 2024-11-01 VITALS
BODY MASS INDEX: 39.34 KG/M2 | WEIGHT: 281 LBS | SYSTOLIC BLOOD PRESSURE: 126 MMHG | DIASTOLIC BLOOD PRESSURE: 88 MMHG | HEIGHT: 71 IN | HEART RATE: 90 BPM | OXYGEN SATURATION: 97 %

## 2024-11-01 DIAGNOSIS — E11.9 TYPE 2 DIABETES MELLITUS WITHOUT COMPLICATION, WITHOUT LONG-TERM CURRENT USE OF INSULIN (HCC): Primary | ICD-10-CM

## 2024-11-01 DIAGNOSIS — E78.49 FAMILIAL HYPERLIPIDEMIA: ICD-10-CM

## 2024-11-01 DIAGNOSIS — E11.9 TYPE 2 DIABETES MELLITUS WITHOUT COMPLICATION, WITHOUT LONG-TERM CURRENT USE OF INSULIN (HCC): ICD-10-CM

## 2024-11-01 DIAGNOSIS — E78.41 ELEVATED LIPOPROTEIN(A): ICD-10-CM

## 2024-11-01 LAB
DEPRECATED RDW RBC AUTO: 13.4 % (ref 12.4–15.4)
HCT VFR BLD AUTO: 43.5 % (ref 40.5–52.5)
HGB BLD-MCNC: 15.2 G/DL (ref 13.5–17.5)
MCH RBC QN AUTO: 30.1 PG (ref 26–34)
MCHC RBC AUTO-ENTMCNC: 35.1 G/DL (ref 31–36)
MCV RBC AUTO: 85.9 FL (ref 80–100)
PLATELET # BLD AUTO: 194 K/UL (ref 135–450)
PMV BLD AUTO: 10.1 FL (ref 5–10.5)
RBC # BLD AUTO: 5.06 M/UL (ref 4.2–5.9)
WBC # BLD AUTO: 5.5 K/UL (ref 4–11)

## 2024-11-01 PROCEDURE — 99214 OFFICE O/P EST MOD 30 MIN: CPT | Performed by: INTERNAL MEDICINE

## 2024-11-01 PROCEDURE — 3044F HG A1C LEVEL LT 7.0%: CPT | Performed by: INTERNAL MEDICINE

## 2024-11-01 SDOH — ECONOMIC STABILITY: INCOME INSECURITY: HOW HARD IS IT FOR YOU TO PAY FOR THE VERY BASICS LIKE FOOD, HOUSING, MEDICAL CARE, AND HEATING?: NOT VERY HARD

## 2024-11-01 SDOH — ECONOMIC STABILITY: FOOD INSECURITY: WITHIN THE PAST 12 MONTHS, THE FOOD YOU BOUGHT JUST DIDN'T LAST AND YOU DIDN'T HAVE MONEY TO GET MORE.: NEVER TRUE

## 2024-11-01 SDOH — ECONOMIC STABILITY: FOOD INSECURITY: WITHIN THE PAST 12 MONTHS, YOU WORRIED THAT YOUR FOOD WOULD RUN OUT BEFORE YOU GOT MONEY TO BUY MORE.: NEVER TRUE

## 2024-11-01 ASSESSMENT — ENCOUNTER SYMPTOMS
PHOTOPHOBIA: 0
WHEEZING: 0
NAUSEA: 0
SHORTNESS OF BREATH: 0
VOMITING: 0
ABDOMINAL PAIN: 0

## 2024-11-01 NOTE — PROGRESS NOTES
This Encounter   Procedures    CBC     Standing Status:   Future     Number of Occurrences:   1     Standing Expiration Date:   11/1/2025    Comprehensive Metabolic Panel     Standing Status:   Future     Number of Occurrences:   1     Standing Expiration Date:   11/1/2025    TSH with Reflex     Standing Status:   Future     Number of Occurrences:   1     Standing Expiration Date:   11/1/2025    Lipid Panel     Standing Status:   Future     Number of Occurrences:   1     Standing Expiration Date:   11/1/2025     Order Specific Question:   Is Patient Fasting?/# of Hours     Answer:   10    Urinalysis     Standing Status:   Future     Standing Expiration Date:   11/1/2025    MICROALBUMIN / CREATININE URINE RATIO     Standing Status:   Future     Standing Expiration Date:   11/1/2025    LIPOPROTEIN A (LPA)     Standing Status:   Future     Number of Occurrences:   1     Standing Expiration Date:   11/1/2025    Hemoglobin A1C     Standing Status:   Future     Number of Occurrences:   1     Standing Expiration Date:   11/1/2025    [facility] - Karina Novak MD, Ophthalmology (Cataract, Comprehensive, Glaucoma, Diabetic Eye Care), [facility]     Referral Priority:   Routine     Referral Type:   Eval and Treat     Referral Reason:   Specialty Services Required     Referred to Provider:   Karina Novak MD     Requested Specialty:   Ophthalmology     Number of Visits Requested:   1    HM DIABETES FOOT EXAM     Current Outpatient Medications   Medication Sig Dispense Refill    meloxicam (MOBIC) 15 MG tablet TAKE 1 TABLET BY MOUTH EVERY DAY 90 tablet 1    metFORMIN (GLUCOPHAGE-XR) 500 MG extended release tablet TAKE 2 TABLETS BY MOUTH DAILY (WITH BREAKFAST). 180 tablet 1    atorvastatin (LIPITOR) 20 MG tablet TAKE 1 TABLET BY MOUTH EVERY DAY 90 tablet 1    glimepiride (AMARYL) 2 MG tablet TAKE 1 TABLET BY MOUTH EVERY DAY IN THE MORNING 90 tablet 1    Gauze Pads & Dressings (GAUZE DRESSING) 4\"X4\" PADS 1 each by Does not apply

## 2024-11-02 LAB
ALBUMIN SERPL-MCNC: 4.7 G/DL (ref 3.4–5)
ALBUMIN/GLOB SERPL: 1.6 {RATIO} (ref 1.1–2.2)
ALP SERPL-CCNC: 79 U/L (ref 40–129)
ALT SERPL-CCNC: 35 U/L (ref 10–40)
ANION GAP SERPL CALCULATED.3IONS-SCNC: 13 MMOL/L (ref 3–16)
AST SERPL-CCNC: 21 U/L (ref 15–37)
BILIRUB SERPL-MCNC: 1.3 MG/DL (ref 0–1)
BUN SERPL-MCNC: 13 MG/DL (ref 7–20)
CALCIUM SERPL-MCNC: 9.5 MG/DL (ref 8.3–10.6)
CHLORIDE SERPL-SCNC: 101 MMOL/L (ref 99–110)
CHOLEST SERPL-MCNC: 154 MG/DL (ref 0–199)
CO2 SERPL-SCNC: 23 MMOL/L (ref 21–32)
CREAT SERPL-MCNC: 1.1 MG/DL (ref 0.9–1.3)
EST. AVERAGE GLUCOSE BLD GHB EST-MCNC: 99.7 MG/DL
GFR SERPLBLD CREATININE-BSD FMLA CKD-EPI: >90 ML/MIN/{1.73_M2}
GLUCOSE SERPL-MCNC: 142 MG/DL (ref 70–99)
HBA1C MFR BLD: 5.1 %
HDLC SERPL-MCNC: 31 MG/DL (ref 40–60)
LDLC SERPL CALC-MCNC: 83 MG/DL
POTASSIUM SERPL-SCNC: 3.9 MMOL/L (ref 3.5–5.1)
PROT SERPL-MCNC: 7.6 G/DL (ref 6.4–8.2)
SODIUM SERPL-SCNC: 137 MMOL/L (ref 136–145)
TRIGL SERPL-MCNC: 202 MG/DL (ref 0–150)
TSH SERPL DL<=0.005 MIU/L-ACNC: 1.47 UIU/ML (ref 0.27–4.2)
VLDLC SERPL CALC-MCNC: 40 MG/DL

## 2024-11-04 LAB — LPA SERPL-MCNC: 107 MG/DL

## 2024-11-05 DIAGNOSIS — E11.9 TYPE 2 DIABETES MELLITUS WITHOUT COMPLICATION, WITHOUT LONG-TERM CURRENT USE OF INSULIN (HCC): ICD-10-CM

## 2024-11-05 DIAGNOSIS — R73.09 ELEVATED GLUCOSE: ICD-10-CM

## 2024-11-05 DIAGNOSIS — E78.41 ELEVATED LIPOPROTEIN(A): ICD-10-CM

## 2024-11-05 RX ORDER — ATORVASTATIN CALCIUM 40 MG/1
40 TABLET, FILM COATED ORAL DAILY
Qty: 90 TABLET | Refills: 1 | Status: SHIPPED | OUTPATIENT
Start: 2024-11-05

## 2024-11-05 NOTE — RESULT ENCOUNTER NOTE
Since lipoprotein a level is significantly elevated, which is a bad marker for atherosclerotic disease, I would like to increase his atorvastatin to 40 mg/day.  New prescription sent

## 2025-02-16 DIAGNOSIS — E11.9 TYPE 2 DIABETES MELLITUS WITHOUT COMPLICATION, WITHOUT LONG-TERM CURRENT USE OF INSULIN (HCC): ICD-10-CM

## 2025-02-17 RX ORDER — GLIMEPIRIDE 2 MG/1
2 TABLET ORAL EVERY MORNING
Qty: 90 TABLET | Refills: 1 | Status: SHIPPED | OUTPATIENT
Start: 2025-02-17

## 2025-06-21 DIAGNOSIS — E11.9 TYPE 2 DIABETES MELLITUS WITHOUT COMPLICATION, WITHOUT LONG-TERM CURRENT USE OF INSULIN (HCC): ICD-10-CM

## 2025-06-23 RX ORDER — METFORMIN HYDROCHLORIDE 500 MG/1
1000 TABLET, EXTENDED RELEASE ORAL
Qty: 180 TABLET | Refills: 1 | OUTPATIENT
Start: 2025-06-23